# Patient Record
Sex: FEMALE | Race: AMERICAN INDIAN OR ALASKA NATIVE | URBAN - METROPOLITAN AREA
[De-identification: names, ages, dates, MRNs, and addresses within clinical notes are randomized per-mention and may not be internally consistent; named-entity substitution may affect disease eponyms.]

---

## 2020-08-24 ENCOUNTER — OUTPATIENT (OUTPATIENT)
Dept: OUTPATIENT SERVICES | Facility: HOSPITAL | Age: 30
LOS: 1 days | Discharge: HOME | End: 2020-08-24

## 2020-08-24 VITALS
HEART RATE: 68 BPM | WEIGHT: 158.07 LBS | RESPIRATION RATE: 17 BRPM | SYSTOLIC BLOOD PRESSURE: 116 MMHG | TEMPERATURE: 97 F | DIASTOLIC BLOOD PRESSURE: 71 MMHG | OXYGEN SATURATION: 100 % | HEIGHT: 62.6 IN

## 2020-08-24 DIAGNOSIS — Z01.818 ENCOUNTER FOR OTHER PREPROCEDURAL EXAMINATION: ICD-10-CM

## 2020-08-24 DIAGNOSIS — K80.20 CALCULUS OF GALLBLADDER WITHOUT CHOLECYSTITIS WITHOUT OBSTRUCTION: ICD-10-CM

## 2020-08-24 LAB
ALBUMIN SERPL ELPH-MCNC: 4.9 G/DL — SIGNIFICANT CHANGE UP (ref 3.5–5.2)
ALP SERPL-CCNC: 57 U/L — SIGNIFICANT CHANGE UP (ref 30–115)
ALT FLD-CCNC: 16 U/L — SIGNIFICANT CHANGE UP (ref 0–41)
ANION GAP SERPL CALC-SCNC: 10 MMOL/L — SIGNIFICANT CHANGE UP (ref 7–14)
AST SERPL-CCNC: 14 U/L — SIGNIFICANT CHANGE UP (ref 0–41)
BASOPHILS # BLD AUTO: 0.05 K/UL — SIGNIFICANT CHANGE UP (ref 0–0.2)
BASOPHILS NFR BLD AUTO: 0.7 % — SIGNIFICANT CHANGE UP (ref 0–1)
BILIRUB SERPL-MCNC: 0.6 MG/DL — SIGNIFICANT CHANGE UP (ref 0.2–1.2)
BUN SERPL-MCNC: 12 MG/DL — SIGNIFICANT CHANGE UP (ref 10–20)
CALCIUM SERPL-MCNC: 9.5 MG/DL — SIGNIFICANT CHANGE UP (ref 8.5–10.1)
CHLORIDE SERPL-SCNC: 103 MMOL/L — SIGNIFICANT CHANGE UP (ref 98–110)
CO2 SERPL-SCNC: 26 MMOL/L — SIGNIFICANT CHANGE UP (ref 17–32)
CREAT SERPL-MCNC: 0.8 MG/DL — SIGNIFICANT CHANGE UP (ref 0.7–1.5)
EOSINOPHIL # BLD AUTO: 0.15 K/UL — SIGNIFICANT CHANGE UP (ref 0–0.7)
EOSINOPHIL NFR BLD AUTO: 2.2 % — SIGNIFICANT CHANGE UP (ref 0–8)
GLUCOSE SERPL-MCNC: 69 MG/DL — LOW (ref 70–99)
HCT VFR BLD CALC: 40.8 % — SIGNIFICANT CHANGE UP (ref 37–47)
HGB BLD-MCNC: 13.2 G/DL — SIGNIFICANT CHANGE UP (ref 12–16)
IMM GRANULOCYTES NFR BLD AUTO: 0.1 % — SIGNIFICANT CHANGE UP (ref 0.1–0.3)
LYMPHOCYTES # BLD AUTO: 2.41 K/UL — SIGNIFICANT CHANGE UP (ref 1.2–3.4)
LYMPHOCYTES # BLD AUTO: 35.9 % — SIGNIFICANT CHANGE UP (ref 20.5–51.1)
MCHC RBC-ENTMCNC: 29.7 PG — SIGNIFICANT CHANGE UP (ref 27–31)
MCHC RBC-ENTMCNC: 32.4 G/DL — SIGNIFICANT CHANGE UP (ref 32–37)
MCV RBC AUTO: 91.9 FL — SIGNIFICANT CHANGE UP (ref 81–99)
MONOCYTES # BLD AUTO: 0.45 K/UL — SIGNIFICANT CHANGE UP (ref 0.1–0.6)
MONOCYTES NFR BLD AUTO: 6.7 % — SIGNIFICANT CHANGE UP (ref 1.7–9.3)
NEUTROPHILS # BLD AUTO: 3.64 K/UL — SIGNIFICANT CHANGE UP (ref 1.4–6.5)
NEUTROPHILS NFR BLD AUTO: 54.4 % — SIGNIFICANT CHANGE UP (ref 42.2–75.2)
NRBC # BLD: 0 /100 WBCS — SIGNIFICANT CHANGE UP (ref 0–0)
PLATELET # BLD AUTO: 332 K/UL — SIGNIFICANT CHANGE UP (ref 130–400)
POTASSIUM SERPL-MCNC: 4.3 MMOL/L — SIGNIFICANT CHANGE UP (ref 3.5–5)
POTASSIUM SERPL-SCNC: 4.3 MMOL/L — SIGNIFICANT CHANGE UP (ref 3.5–5)
PROT SERPL-MCNC: 7.5 G/DL — SIGNIFICANT CHANGE UP (ref 6–8)
RBC # BLD: 4.44 M/UL — SIGNIFICANT CHANGE UP (ref 4.2–5.4)
RBC # FLD: 12.4 % — SIGNIFICANT CHANGE UP (ref 11.5–14.5)
SODIUM SERPL-SCNC: 139 MMOL/L — SIGNIFICANT CHANGE UP (ref 135–146)
WBC # BLD: 6.71 K/UL — SIGNIFICANT CHANGE UP (ref 4.8–10.8)
WBC # FLD AUTO: 6.71 K/UL — SIGNIFICANT CHANGE UP (ref 4.8–10.8)

## 2020-08-24 NOTE — H&P PST ADULT - REASON FOR ADMISSION
28 Y/O FEMALE HERE FOR PRE-ADMISSION SURGICAL TESTING. PATIENT REPORTS SHE WENT TO GI MD WITH C/O BLOATING. W/U REVEALED +GALLBLADDER POLYP.   NOW FOR SCHEDULED PROCEDURE.

## 2020-08-24 NOTE — H&P PST ADULT - NSANTHOSAYNRD_GEN_A_CORE
No. ALEXANDR screening performed.  STOP BANG Legend: 0-2 = LOW Risk; 3-4 = INTERMEDIATE Risk; 5-8 = HIGH Risk

## 2020-08-24 NOTE — H&P PST ADULT - HISTORY OF PRESENT ILLNESS
PATIENT DENIES CHEST PAIN, SHORTNESS OF BREATH, PALPITATIONS, COUGHING, FEVER, DYSURIA.  CAN WALK UP 4-5  FLIGHTS OF STEPS WITHOUT SOB.

## 2020-08-28 ENCOUNTER — OUTPATIENT (OUTPATIENT)
Dept: OUTPATIENT SERVICES | Facility: HOSPITAL | Age: 30
LOS: 1 days | Discharge: HOME | End: 2020-08-28

## 2020-08-28 DIAGNOSIS — Z11.59 ENCOUNTER FOR SCREENING FOR OTHER VIRAL DISEASES: ICD-10-CM

## 2020-08-28 PROBLEM — Z78.9 OTHER SPECIFIED HEALTH STATUS: Chronic | Status: ACTIVE | Noted: 2020-08-24

## 2020-08-31 ENCOUNTER — RESULT REVIEW (OUTPATIENT)
Age: 30
End: 2020-08-31

## 2020-08-31 ENCOUNTER — OUTPATIENT (OUTPATIENT)
Dept: OUTPATIENT SERVICES | Facility: HOSPITAL | Age: 30
LOS: 1 days | Discharge: HOME | End: 2020-08-31
Payer: COMMERCIAL

## 2020-08-31 VITALS
HEIGHT: 63 IN | DIASTOLIC BLOOD PRESSURE: 73 MMHG | TEMPERATURE: 97 F | RESPIRATION RATE: 17 BRPM | OXYGEN SATURATION: 99 % | WEIGHT: 156.97 LBS | SYSTOLIC BLOOD PRESSURE: 106 MMHG | HEART RATE: 64 BPM

## 2020-08-31 VITALS — SYSTOLIC BLOOD PRESSURE: 102 MMHG | RESPIRATION RATE: 17 BRPM | HEART RATE: 61 BPM | DIASTOLIC BLOOD PRESSURE: 60 MMHG

## 2020-08-31 PROCEDURE — 88304 TISSUE EXAM BY PATHOLOGIST: CPT | Mod: 26

## 2020-08-31 RX ORDER — OXYCODONE AND ACETAMINOPHEN 5; 325 MG/1; MG/1
1 TABLET ORAL EVERY 4 HOURS
Refills: 0 | Status: DISCONTINUED | OUTPATIENT
Start: 2020-08-31 | End: 2020-08-31

## 2020-08-31 RX ORDER — ONDANSETRON 8 MG/1
4 TABLET, FILM COATED ORAL ONCE
Refills: 0 | Status: DISCONTINUED | OUTPATIENT
Start: 2020-08-31 | End: 2020-09-14

## 2020-08-31 RX ORDER — MORPHINE SULFATE 50 MG/1
2 CAPSULE, EXTENDED RELEASE ORAL
Refills: 0 | Status: DISCONTINUED | OUTPATIENT
Start: 2020-08-31 | End: 2020-08-31

## 2020-08-31 RX ORDER — SODIUM CHLORIDE 9 MG/ML
1000 INJECTION, SOLUTION INTRAVENOUS
Refills: 0 | Status: DISCONTINUED | OUTPATIENT
Start: 2020-08-31 | End: 2020-09-14

## 2020-08-31 RX ORDER — HYDROMORPHONE HYDROCHLORIDE 2 MG/ML
0.5 INJECTION INTRAMUSCULAR; INTRAVENOUS; SUBCUTANEOUS
Refills: 0 | Status: DISCONTINUED | OUTPATIENT
Start: 2020-08-31 | End: 2020-08-31

## 2020-08-31 RX ADMIN — HYDROMORPHONE HYDROCHLORIDE 0.5 MILLIGRAM(S): 2 INJECTION INTRAMUSCULAR; INTRAVENOUS; SUBCUTANEOUS at 10:17

## 2020-08-31 RX ADMIN — SODIUM CHLORIDE 100 MILLILITER(S): 9 INJECTION, SOLUTION INTRAVENOUS at 09:52

## 2020-08-31 RX ADMIN — HYDROMORPHONE HYDROCHLORIDE 0.5 MILLIGRAM(S): 2 INJECTION INTRAMUSCULAR; INTRAVENOUS; SUBCUTANEOUS at 10:30

## 2020-08-31 NOTE — ASU DISCHARGE PLAN (ADULT/PEDIATRIC) - ASU DC SPECIAL INSTRUCTIONSFT
SURGERY DISCHARGE INSTRUCTIONS    FOLLOW-UP - with Dr. Clancy in 1 week. Call the office to make an appointment or if you have any questions/concerns.    DIET - regular.    ACTIVITY- No heavy lifting for 4 wks over 10-20 lbs. Walking is encouraged. No running or swimming. No driving while taking pain medication.    WOUNDCARE - Some drainage from your incisions or drain sites is normal. Keep your dressings on. May shower 24 hours after surgery but no submerging wound under water for 2 weeks (tub bathing).  COLOSTOMY - If you have a colostomy follow your specific intstructions.    PAIN MEDS - Take prescription pain meds as instructed but only if needed as they can be addicting. Take over the counter extra strength tylenol 500mg and/or ibprofen 400mg with food every 6 hours for pain instead of prescription pain meds if you do not need stronger pain control. Do not take tylenol in addition to your prescription pain med if your prescription pain med already has tylenol in it. No more than 4g of tylenol in 24hrs or 1g in 4 hrs. No mixing alcohol with prescription pain meds.

## 2020-08-31 NOTE — ASU DISCHARGE PLAN (ADULT/PEDIATRIC) - CALL YOUR DOCTOR IF YOU HAVE ANY OF THE FOLLOWING:
Swelling that gets worse/Bleeding that does not stop/Fever greater than (need to indicate Fahrenheit or Celsius)/Wound/Surgical Site with redness, or foul smelling discharge or pus/Pain not relieved by Medications

## 2020-08-31 NOTE — CHART NOTE - NSCHARTNOTEFT_GEN_A_CORE
PACU ANESTHESIA ADMISSION NOTE      Procedure:   Post op diagnosis:      ____  Intubated  TV:______       Rate: ______      FiO2: ______    ___x_  Patent Airway    __x__  Full return of protective reflexes    __x__  Full recovery from anesthesia / back to baseline status    Vitals:  T(C): 36.2 (08-31-20 @ 07:39), Max: 36.2 (08-31-20 @ 07:03)  HR: 64 (08-31-20 @ 07:39) (64 - 64)  BP: 106/73 (08-31-20 @ 07:39) (106/73 - 106/73)  RR: 17 (08-31-20 @ 07:39) (17 - 17)  SpO2: 99% (08-31-20 @ 07:39) (99% - 99%)    Mental Status:  ___x_ Awake   __x___ Alert   _____ Drowsy   _____ Sedated    Nausea/Vomiting:  ____ NO  ____x__Yes,   See Post - Op Orders          Pain Scale (0-10):  _____    Treatment: ____ None    __x__ See Post - Op/PCA Orders    Post - Operative Fluids:   ____ Oral   ___x_ See Post - Op Orders    Plan: Discharge:   __x__Home       _____Floor     _____Critical Care    _____  Other:_________________    Comments: uneventful anesthesia course no complications. VItals stable. Pt transferred to PACU

## 2020-08-31 NOTE — ASU DISCHARGE PLAN (ADULT/PEDIATRIC) - CARE PROVIDER_API CALL
Blanca Clancy  SURGERY  [address] [place] [address]  Phone: [phone]  Fax: [phone]  Follow Up Time: 1 week

## 2020-09-08 DIAGNOSIS — K80.10 CALCULUS OF GALLBLADDER WITH CHRONIC CHOLECYSTITIS WITHOUT OBSTRUCTION: ICD-10-CM

## 2020-09-08 DIAGNOSIS — K82.8 OTHER SPECIFIED DISEASES OF GALLBLADDER: ICD-10-CM

## 2020-09-08 LAB — SURGICAL PATHOLOGY STUDY: SIGNIFICANT CHANGE UP

## 2020-10-16 PROBLEM — Z00.00 ENCOUNTER FOR PREVENTIVE HEALTH EXAMINATION: Status: ACTIVE | Noted: 2020-10-16

## 2020-10-22 ENCOUNTER — APPOINTMENT (OUTPATIENT)
Dept: BREAST CENTER | Facility: CLINIC | Age: 30
End: 2020-10-22
Payer: COMMERCIAL

## 2020-10-22 VITALS
HEIGHT: 63 IN | BODY MASS INDEX: 28.17 KG/M2 | WEIGHT: 159 LBS | TEMPERATURE: 97.6 F | SYSTOLIC BLOOD PRESSURE: 116 MMHG | DIASTOLIC BLOOD PRESSURE: 80 MMHG

## 2020-10-22 DIAGNOSIS — Z80.0 FAMILY HISTORY OF MALIGNANT NEOPLASM OF DIGESTIVE ORGANS: ICD-10-CM

## 2020-10-22 PROCEDURE — 99203 OFFICE O/P NEW LOW 30 MIN: CPT

## 2020-10-22 NOTE — DATA REVIEWED
[FreeTextEntry1] : Left Breast Sono - 08/21/2020:\par Ultrasound evaluation was performed including examination of all four quadrants of the breast(s) and the retroareolar region(s).\par In the left breast at 11:00 location 3 cm from the nipple likely corresponding to the finding seen on the CT there is a hypoechoic mass measuring 1.4 x 1.1 x 1.6 cm, may represent a complicated cyst. Ultrasound-guided aspiration/core biopsy is recommended for further evaluation.\par IMPRESSION:\par Indeterminant mass in the left breast 11:00 location 3 cm from the nipple likely corresponding to the findings seen on the CT for which ultrasound guided aspiration/core biopsy is recommended.\par Findings and recommendations were discussed with the patient.\par Biopsy of the left breast(s) is recommended. A letter will be sent to the patient to return for a biopsy.\par BI-RADS 4: SUSPICIOUS - CATEGORY 4A: LOW SUSPICION FOR MALIGNANCY\par \par US Guided Core Bx - 09/14/2020:\par Left, 11:00 N3, 1.6cm: (cork)\par Final Diagnosis\par Breast, left, 11 o'clock, 3 cm FN, ultrasound (US) guided biopsy:\par - Fibroepithelial lesion with increased stromal cellularity, see comment\par - Fascicular pseudoangiomatous stromal hyperplasia (PASH)\par - Background fibroadenomatoid and proliferative fibrocystic changes\par Comment: Differential diagnosis includes phyllodes tumor and cellular fibroadenoma.\par \par P63 (stromal cells-negative), CD34 (PASH stromal cells-positive), -QUD CK 34BE12(stromal cells -negative), calponin(myoepithelial cells-positive, PASH stromal cells-positive) immunostains performed on Block A1 support the diagnosis.\par Case was seen by another breast pathologist, who concurs.\par \par The pathology results are concordant with the imaging findings.

## 2020-10-22 NOTE — HISTORY OF PRESENT ILLNESS
[FreeTextEntry1] : PCP: Dr. Zoila Snell\par \par s/p US Guided Core Bx - 09/14/2020:\par Left, 11:00 N3, 1.6cm: (cork)\par Final Diagnosis\par Breast, left, 11 o'clock, 3 cm FN, ultrasound (US) guided biopsy:\par - Fibroepithelial lesion with increased stromal cellularity, see comment\par - Fascicular pseudoangiomatous stromal hyperplasia (PASH)\par - Background fibroadenomatoid and proliferative fibrocystic changes\par Comment: Differential diagnosis includes phyllodes tumor and cellular fibroadenoma.\par \par Pt denies any breast pain, palpable lumps, nipple discharge or inversion and / or skin changes.\par She had a CT scan for an abdominal issue which incidentally reported a left breast mass and she was sent for breast imaging.\par \par (-) family hx - breast / ovarian cancer.

## 2020-10-22 NOTE — PAST MEDICAL HISTORY
[Menstruating] : The patient is menstruating [Menarche Age ____] : age at menarche was [unfilled] [Total Preg ___] : G[unfilled] [Live Births ___] : P[unfilled]  [FreeTextEntry6] : No. [FreeTextEntry7] : No. [FreeTextEntry8] : N/A.

## 2020-10-22 NOTE — ASSESSMENT
[FreeTextEntry1] : SEBASTIAN is a adolph 29 year old patient who presented today in follow up for initial surgical consultation.\par She is status post ultrasound guided biopsy of the left breast on 09/14/2020.\par Pathology revealed a fibroepithelial lesion with increased stromal cellularity.\par \par On physical exam, there are no discrete masses in either breast or axilla.  There is no nipple discharge or inversion bilaterally.  There are no skin changes bilaterally.  \par \par In regards to the breast fibroepithelial lesion, the differential diagnosis for these lesions are fibroadenomas vs. phyllodes tumors.  We discussed fibroadenomas. These are benign lesions without any malignant potential. They are hormonally influenced and can increase or decrease in size and can also regress spontaneously. They are considered proliferative lesions without atypia. Patients with these lesions have been found to have a slightly increased relative risk of breast cancer compared to the reference population. Surgical excision is recommended when the diagnosis in unclear, the lesion is causing pain or breast deformity, or if it is rapidly enlarging. \par \par Because her diagnosis is not clear, I have recommended surgical excision of her right breast mass. The mass is not palpable, and will therefore need pre-operative wire localization.  She agrees to pre-operative wire localization and excision of the breast mass.  \par \par Informed consent was obtained today.  She is aware that she will meet with the pre-surgical department here at the hospital for pre-surgery testing.  She is also aware that she will likely need to meet with her primary care physician, and/or other medical specialists to obtain clearance for surgery, and to contact them appropriately.  \par \par I spent a total of 30 minutes of face to face time with this patient, greater than 50% of which was spent in counseling and/or coordination of care.\par All of her questions were appropriately answered.\par She knows to call with any concerns.\par

## 2020-10-30 ENCOUNTER — LABORATORY RESULT (OUTPATIENT)
Age: 30
End: 2020-10-30

## 2020-11-05 ENCOUNTER — OUTPATIENT (OUTPATIENT)
Dept: OUTPATIENT SERVICES | Facility: HOSPITAL | Age: 30
LOS: 1 days | Discharge: HOME | End: 2020-11-05

## 2020-11-05 DIAGNOSIS — Z02.9 ENCOUNTER FOR ADMINISTRATIVE EXAMINATIONS, UNSPECIFIED: ICD-10-CM

## 2020-11-06 ENCOUNTER — OUTPATIENT (OUTPATIENT)
Dept: OUTPATIENT SERVICES | Facility: HOSPITAL | Age: 30
LOS: 1 days | Discharge: HOME | End: 2020-11-06

## 2020-11-06 ENCOUNTER — NON-APPOINTMENT (OUTPATIENT)
Age: 30
End: 2020-11-06

## 2020-11-06 VITALS
HEART RATE: 72 BPM | DIASTOLIC BLOOD PRESSURE: 60 MMHG | RESPIRATION RATE: 16 BRPM | OXYGEN SATURATION: 99 % | TEMPERATURE: 98 F | HEIGHT: 62 IN | WEIGHT: 158.95 LBS | SYSTOLIC BLOOD PRESSURE: 104 MMHG

## 2020-11-06 DIAGNOSIS — Z90.49 ACQUIRED ABSENCE OF OTHER SPECIFIED PARTS OF DIGESTIVE TRACT: Chronic | ICD-10-CM

## 2020-11-06 RX ORDER — OMEGA-3 ACID ETHYL ESTERS 1 G
1 CAPSULE ORAL
Qty: 0 | Refills: 0 | DISCHARGE

## 2020-11-06 NOTE — H&P PST ADULT - HISTORY OF PRESENT ILLNESS
28 Y/O FEMALE PRESENTS TO PAST WITH C/O LUMP TO LEFT BREAST               PT NOW FOR SCHEDULED PROCEDURE. PT DENIES ANY CP SOB PALP COUGH DYSURIA FEVER URI. PT ABLE TO HANNAH 1-2 FOS W/O SOB  pt denies any covid s/s, or tested positive in the past  pt advised self quarantine till day of procedure  Anesthesia Alert  NO--Difficult Airway  NO--History of neck surgery or radiation  NO--Limited ROM of neck  NO--History of Malignant hyperthermia  NO--No personal or family history of Pseudocholinesterase deficiency.  NO--Prior Anesthesia Complication  NO--Latex Allergy  NO--Loose teeth  NO--History of Rheumatoid Arthritis  NO--ALEXANDR  NO--Other_____   30 Y/O FEMALE PRESENTS TO PAST WITH C/O LUMP TO LEFT BREAST . PT STATES WAS DISCOVERED ON CT ( S/P LAP AMY 9/2020)  PT NOW FOR SCHEDULED PROCEDURE. PT DENIES ANY CP SOB PALP COUGH DYSURIA FEVER URI. PT ABLE TO HANNAH 1-2 FOS W/O SOB  pt denies any covid s/s, or tested positive in the past  pt advised self quarantine till day of procedure  Anesthesia Alert  NO--Difficult Airway  NO--History of neck surgery or radiation  NO--Limited ROM of neck  NO--History of Malignant hyperthermia  NO--No personal or family history of Pseudocholinesterase deficiency.  NO--Prior Anesthesia Complication  NO--Latex Allergy  NO--Loose teeth  NO--History of Rheumatoid Arthritis  NO--ALEXANDR  NO--Other_____

## 2020-11-06 NOTE — H&P PST ADULT - NSICDXPASTSURGICALHX_GEN_ALL_CORE_FT
PAST SURGICAL HISTORY:  No significant past surgical history      PAST SURGICAL HISTORY:  History of cholecystectomy

## 2020-11-09 ENCOUNTER — NON-APPOINTMENT (OUTPATIENT)
Age: 30
End: 2020-11-09

## 2020-11-09 DIAGNOSIS — Z01.818 ENCOUNTER FOR OTHER PREPROCEDURAL EXAMINATION: ICD-10-CM

## 2020-11-09 DIAGNOSIS — N63.0 UNSPECIFIED LUMP IN UNSPECIFIED BREAST: ICD-10-CM

## 2020-11-13 ENCOUNTER — OUTPATIENT (OUTPATIENT)
Dept: OUTPATIENT SERVICES | Facility: HOSPITAL | Age: 30
LOS: 1 days | Discharge: HOME | End: 2020-11-13

## 2020-11-13 ENCOUNTER — LABORATORY RESULT (OUTPATIENT)
Age: 30
End: 2020-11-13

## 2020-11-13 DIAGNOSIS — Z11.59 ENCOUNTER FOR SCREENING FOR OTHER VIRAL DISEASES: ICD-10-CM

## 2020-11-13 DIAGNOSIS — Z90.49 ACQUIRED ABSENCE OF OTHER SPECIFIED PARTS OF DIGESTIVE TRACT: Chronic | ICD-10-CM

## 2020-11-16 ENCOUNTER — RESULT REVIEW (OUTPATIENT)
Age: 30
End: 2020-11-16

## 2020-11-16 ENCOUNTER — OUTPATIENT (OUTPATIENT)
Dept: OUTPATIENT SERVICES | Facility: HOSPITAL | Age: 30
LOS: 1 days | Discharge: HOME | End: 2020-11-16
Payer: COMMERCIAL

## 2020-11-16 ENCOUNTER — APPOINTMENT (OUTPATIENT)
Dept: BREAST CENTER | Facility: AMBULATORY SURGERY CENTER | Age: 30
End: 2020-11-16
Payer: COMMERCIAL

## 2020-11-16 VITALS
DIASTOLIC BLOOD PRESSURE: 56 MMHG | HEART RATE: 60 BPM | SYSTOLIC BLOOD PRESSURE: 92 MMHG | TEMPERATURE: 98 F | RESPIRATION RATE: 14 BRPM | OXYGEN SATURATION: 100 %

## 2020-11-16 VITALS
SYSTOLIC BLOOD PRESSURE: 97 MMHG | OXYGEN SATURATION: 100 % | HEIGHT: 62 IN | WEIGHT: 158.95 LBS | RESPIRATION RATE: 18 BRPM | DIASTOLIC BLOOD PRESSURE: 64 MMHG | HEART RATE: 62 BPM | TEMPERATURE: 98 F

## 2020-11-16 DIAGNOSIS — Z90.49 ACQUIRED ABSENCE OF OTHER SPECIFIED PARTS OF DIGESTIVE TRACT: Chronic | ICD-10-CM

## 2020-11-16 PROCEDURE — 19125 EXCISION BREAST LESION: CPT | Mod: LT

## 2020-11-16 PROCEDURE — 88305 TISSUE EXAM BY PATHOLOGIST: CPT | Mod: 26

## 2020-11-16 PROCEDURE — 19285 PERQ DEV BREAST 1ST US IMAG: CPT | Mod: LT

## 2020-11-16 RX ORDER — ONDANSETRON 8 MG/1
4 TABLET, FILM COATED ORAL ONCE
Refills: 0 | Status: DISCONTINUED | OUTPATIENT
Start: 2020-11-16 | End: 2020-11-30

## 2020-11-16 RX ORDER — SODIUM CHLORIDE 9 MG/ML
1000 INJECTION, SOLUTION INTRAVENOUS
Refills: 0 | Status: DISCONTINUED | OUTPATIENT
Start: 2020-11-16 | End: 2020-11-30

## 2020-11-16 RX ORDER — MORPHINE SULFATE 50 MG/1
2 CAPSULE, EXTENDED RELEASE ORAL
Refills: 0 | Status: DISCONTINUED | OUTPATIENT
Start: 2020-11-16 | End: 2020-11-16

## 2020-11-16 RX ORDER — OXYCODONE AND ACETAMINOPHEN 5; 325 MG/1; MG/1
1 TABLET ORAL EVERY 4 HOURS
Refills: 0 | Status: DISCONTINUED | OUTPATIENT
Start: 2020-11-16 | End: 2020-11-16

## 2020-11-16 RX ORDER — OXYCODONE AND ACETAMINOPHEN 5; 325 MG/1; MG/1
5-325 TABLET ORAL
Qty: 15 | Refills: 0 | Status: ACTIVE | COMMUNITY
Start: 2020-11-16 | End: 1900-01-01

## 2020-11-16 RX ADMIN — SODIUM CHLORIDE 100 MILLILITER(S): 9 INJECTION, SOLUTION INTRAVENOUS at 12:40

## 2020-11-16 NOTE — BRIEF OPERATIVE NOTE - NSICDXBRIEFPROCEDURE_GEN_ALL_CORE_FT
PROCEDURES:  Lumpectomy of left breast after needle localization 16-Nov-2020 12:24:47  Lebron Gomez

## 2020-11-16 NOTE — CHART NOTE - NSCHARTNOTEFT_GEN_A_CORE
PACU ANESTHESIA ADMISSION NOTE      Procedure:   Post op diagnosis:      ____  Intubated  TV:______       Rate: ______      FiO2: ______    _x___  Patent Airway    _x___  Full return of protective reflexes    _x___  Full recovery from anesthesia / back to baseline status    Vitals:  T(C): 36.8  HR: 62  BP: 97/64  RR: 18  SpO2: 100%    Mental Status:  _x___ Awake   _____ Alert   _____ Drowsy   _____ Sedated    Nausea/Vomiting:  _x___  NO       ______Yes,   See Post - Op Orders         Pain Scale (0-10):  __0___    Treatment: _x___ None    ____ See Post - Op/PCA Orders    Post - Operative Fluids:   __x__ Oral   ____ See Post - Op Orders    Plan: Discharge:   _x___Home       _____Floor     _____Critical Care    _____  Other:_________________    Comments:  No anesthesia issues or complications noted.  Discharge when criteria met.

## 2020-11-16 NOTE — ASU DISCHARGE PLAN (ADULT/PEDIATRIC) - CARE PROVIDER_API CALL
Lebron Gomez  SURGERY  [facility], [address]nd [place] [address]  Phone: [phone]  Fax: [phone]  Established Patient  Follow Up Time:

## 2020-11-16 NOTE — ASU DISCHARGE PLAN (ADULT/PEDIATRIC) - PATIENT EDUCATION MATERIALS PROVIED
Provider pre-printed instructions given/Pre-printed instructions given for other (specify)/pain management

## 2020-11-18 LAB — SURGICAL PATHOLOGY STUDY: SIGNIFICANT CHANGE UP

## 2020-11-19 DIAGNOSIS — N63.20 UNSPECIFIED LUMP IN THE LEFT BREAST, UNSPECIFIED QUADRANT: ICD-10-CM

## 2020-12-01 ENCOUNTER — APPOINTMENT (OUTPATIENT)
Dept: BREAST CENTER | Facility: CLINIC | Age: 30
End: 2020-12-01
Payer: COMMERCIAL

## 2020-12-01 VITALS
TEMPERATURE: 97.2 F | WEIGHT: 159 LBS | SYSTOLIC BLOOD PRESSURE: 112 MMHG | BODY MASS INDEX: 28.17 KG/M2 | HEIGHT: 63 IN | DIASTOLIC BLOOD PRESSURE: 74 MMHG

## 2020-12-01 PROCEDURE — 99024 POSTOP FOLLOW-UP VISIT: CPT

## 2021-01-28 NOTE — HISTORY OF PRESENT ILLNESS
[FreeTextEntry1] : PCP: Dr. Zoila Snell\par \par s/p US Guided Core Bx - 09/14/2020:\par Left, 11:00 N3, 1.6cm: (cork)\par Final Diagnosis\par Breast, left, 11 o'clock, 3 cm FN, ultrasound (US) guided biopsy:\par - Fibroepithelial lesion with increased stromal cellularity, see comment\par - Fascicular pseudoangiomatous stromal hyperplasia (PASH)\par - Background fibroadenomatoid and proliferative fibrocystic changes\par Comment: Differential diagnosis includes phyllodes tumor and cellular fibroadenoma.\par \par Pt denies any breast pain, palpable lumps, nipple discharge or inversion and / or skin changes.\par She had a CT scan for an abdominal issue which incidentally reported a left breast mass and she was sent for breast imaging.\par \par (-) family hx - breast / ovarian cancer.\par \par s/p Left breast Mass Excision - 11/16/2020:\par - Fibroepithelial lesion, favor partly cellular fibroadenoma,

## 2021-01-28 NOTE — REASON FOR VISIT
[Post Op: _________] : a [unfilled] post op visit [Spouse] : spouse [FreeTextEntry1] : s/p Left breast Mass Excision - 11/16/2020.

## 2021-01-28 NOTE — ASSESSMENT
[FreeTextEntry1] : SEBASTIAN is a adolph 30 year old patient who presented today to the office for her initial post-operative visit.\par She is status post Left breast Mass Excision on 11/16/2020.\par Pathology revealed fibroepithelial lesion, favor partly cellular fibroadenoma.\par She is feeling well.\par She denies any fever / chills or erythema and / or drainage related to the incision.\par Her pain is well controlled, only complains of mild soreness of the area.\par Her suture was removed and pathology was discussed.\par \par Plan:\par 1. Return for follow-up and clinical breast exam in 6 months.\par \par I spent a total of 15 minutes of face to face time with this patient, greater than 50% of which was spent in counseling and/or coordination of care.\par All of her questions were appropriately answered.\par She knows to call with any concerns.\par

## 2021-01-28 NOTE — REVIEW OF SYSTEMS
[Itching] : itching [Negative] : Constitutional [Fever] : no fever [Chills] : no chills [Breast Pain] : no breast pain [Breast Lump] : no breast lump [Nipple Discharge] : no nipple discharge [Nipple Inverted] : no inversion of the nipple

## 2021-01-28 NOTE — CONSULT LETTER
[Courtesy Letter:] : I had the pleasure of seeing your patient, [unfilled], in my office today. [( Thank you for referring [unfilled] for consultation for _____ )] : Thank you for referring [unfilled] for consultation for [unfilled] [Please see my note below.] : Please see my note below. [Sincerely,] : Sincerely, [FreeTextEntry2] : Zoila Perry MD\[facility]\[address]\[place] [address] [FreeTextEntry3] : Lebron Gomez M.D., F.A.C.S.\par

## 2021-01-28 NOTE — DATA REVIEWED
[FreeTextEntry1] : Surgical Final Report\par \par Final Diagnosis\par 1. Left breast mass needle localization:\par - Fibroepithelial lesion, favor partly cellular fibroadenoma,\par appears entirely excised (see also part 2).\par - Overt histopathologic features of phyllodes tumor are not seen\par in the examined sections.\par - No malignancy is seen.\par - Previous biopsy site is identified.\par \par 2. Left breast medial margin:\par - New tissue margin appears free of lesion.\par

## 2021-03-15 ENCOUNTER — NON-APPOINTMENT (OUTPATIENT)
Age: 31
End: 2021-03-15

## 2021-06-02 ENCOUNTER — APPOINTMENT (OUTPATIENT)
Dept: BREAST CENTER | Facility: CLINIC | Age: 31
End: 2021-06-02

## 2021-08-19 ENCOUNTER — APPOINTMENT (OUTPATIENT)
Dept: BREAST CENTER | Facility: CLINIC | Age: 31
End: 2021-08-19
Payer: COMMERCIAL

## 2021-10-14 ENCOUNTER — APPOINTMENT (OUTPATIENT)
Dept: BREAST CENTER | Facility: CLINIC | Age: 31
End: 2021-10-14
Payer: COMMERCIAL

## 2021-10-14 VITALS
BODY MASS INDEX: 29.06 KG/M2 | DIASTOLIC BLOOD PRESSURE: 80 MMHG | SYSTOLIC BLOOD PRESSURE: 118 MMHG | TEMPERATURE: 97.2 F | WEIGHT: 164 LBS | HEIGHT: 63 IN

## 2021-10-14 DIAGNOSIS — N64.4 MASTODYNIA: ICD-10-CM

## 2021-10-14 DIAGNOSIS — Q83.1 ACCESSORY BREAST: ICD-10-CM

## 2021-10-14 DIAGNOSIS — N63.20 UNSPECIFIED LUMP IN THE LEFT BREAST, UNSPECIFIED QUADRANT: ICD-10-CM

## 2021-10-14 PROCEDURE — 99213 OFFICE O/P EST LOW 20 MIN: CPT

## 2021-10-14 NOTE — HISTORY OF PRESENT ILLNESS
[FreeTextEntry1] : s/p US Guided Core Bx - 09/14/2020:\par Left, 11:00 N3, 1.6cm: (cork)\par Final Diagnosis\par Breast, left, 11 o'clock, 3 cm FN, ultrasound (US) guided biopsy:\par - Fibroepithelial lesion with increased stromal cellularity, see comment\par - Fascicular pseudoangiomatous stromal hyperplasia (PASH)\par - Background fibroadenomatoid and proliferative fibrocystic changes\par Comment: Differential diagnosis includes phyllodes tumor and cellular fibroadenoma.\par \par Pt denies any breast pain, palpable lumps, nipple discharge or inversion and / or skin changes.\par She had a CT scan for an abdominal issue which incidentally reported a left breast mass and she was sent for breast imaging.\par \par HISTORICAL RISK FACTORS:\par -(-) family hx - breast / ovarian cancer.\par PCP: Dr. Zoila Snell\par \par \par s/p Left breast Mass Excision - 11/16/2020:\par - Fibroepithelial lesion, favor partly cellular fibroadenoma,\par \par INTERVAL HISTORY: 10/14/21\par Katarina is 30 year old female here s/p Left breast mass excision on 11/16/20\par \par She is complaining of pain in her bilateral UOQ and in the accessory breast tissue present in her axilla.  She is also has intermittent breast pain in the LIQ of her left breast.  She has not palpated any abnormal masses and denies any other breast related complaints. \par

## 2021-10-14 NOTE — PHYSICAL EXAM
[Normocephalic] : normocephalic [Atraumatic] : atraumatic [EOMI] : extra ocular movement intact [No Supraclavicular Adenopathy] : no supraclavicular adenopathy [No Cervical Adenopathy] : no cervical adenopathy [No dominant masses] : no dominant masses in right breast  [No dominant masses] : no dominant masses left breast [No Nipple Retraction] : no left nipple retraction [No Nipple Discharge] : no left nipple discharge [No Axillary Lymphadenopathy] : no left axillary lymphadenopathy [Soft] : abdomen soft [Not Tender] : non-tender [No Edema] : no edema [No Rashes] : no rashes [No Ulceration] : no ulceration [de-identified] : accessory breast tissue in b/l axilla; no suspicious abnormalities palpated within either breast  [de-identified] : surgical incision is well healed

## 2021-10-14 NOTE — ASSESSMENT
[FreeTextEntry1] : SEBASTIAN is a adolph 30 year old patient who presented today to the office for her  \par She is status post Left breast Mass Excision on 11/16/2020.\par \par \par On exam, she has bilateral accessory breast tissue in the axillae but no suspicious abnormalities were palpated within either breast. \par \par In light of her new symptoms of breast pain, I have ordered her for a b/l targetted US.  If this is unrevealing, she can follow up in 1 year for a CBE. \par \par In regards to her breast pain, it may be related to fibrocystic changes within her breast that are hormonally influenced. We spoke about possible interventions including evening primrose oil, supportive bras, and decreasing caffeine intake.  Although none of these have been consistently proven to improve breast pain, they may be tried.  If the pain becomes very severe, there have been studies of tamoxifen being effective for the treatment of breast pain, although there are risks with tamoxifen.  At this time she will try supportive measures.\par \par In regards to accessory breast tissue in the axilla, I have offered to refer her to the plastic surgeon.  At this time, she is not interested in meeting the plastic surgeon. \par \par Otherwise she is at an average risk for breast cancer and should start with annual screening mammograms at the age of 42. \par \par All of her questions were answered.  She knows to call with any further questions or concerns. \par \par Plan:\par -b/l US now\par -if unrevealing, she can follow up in 1 year \par \par \par \par

## 2022-03-02 ENCOUNTER — APPOINTMENT (OUTPATIENT)
Dept: SURGERY | Facility: CLINIC | Age: 32
End: 2022-03-02
Payer: COMMERCIAL

## 2022-03-02 VITALS
BODY MASS INDEX: 28.88 KG/M2 | WEIGHT: 163 LBS | TEMPERATURE: 97.8 F | HEART RATE: 73 BPM | SYSTOLIC BLOOD PRESSURE: 98 MMHG | DIASTOLIC BLOOD PRESSURE: 60 MMHG | HEIGHT: 63 IN

## 2022-03-02 DIAGNOSIS — K60.2 ANAL FISSURE, UNSPECIFIED: ICD-10-CM

## 2022-03-02 PROCEDURE — 46600 DIAGNOSTIC ANOSCOPY SPX: CPT

## 2022-03-02 PROCEDURE — 99203 OFFICE O/P NEW LOW 30 MIN: CPT | Mod: 25

## 2022-03-04 RX ORDER — ELASTIC BANDAGE 2"X2.2YD
BANDAGE TOPICAL
Refills: 0 | Status: ACTIVE | COMMUNITY

## 2022-03-04 NOTE — HISTORY OF PRESENT ILLNESS
[FreeTextEntry1] : Patient is a 31F with no PMH who presents for evaluation of anal pain and bleeding.  Patient states she has pain and pressure with sitting.  She notices only mild irritation with BM.  She has 1-2 BM daily and they are irregular.  Patient denies fevers, chills, nausea, vomiting, abdominal pain, constipation, diarrhea, blood in the stool or unexpected weight loss.  Patient denies a family history of colon cancer rectal cancer or inflammatory bowel disease.  Patient is pending a colonoscopy later this week with Dr. Larkin

## 2022-03-04 NOTE — PHYSICAL EXAM
[No HSM] : no hepatosplenomegaly [Anterior] : anteriorly [Skin Tags] : residual hemorrhoidal skin tags were noted [Tight] : was tight [None] : there was no rectal mass  [Respiratory Effort] : normal respiratory effort [Normal Rate and Rhythm] : normal rate and rhythm [Abdomen Masses] : No abdominal masses [Abdomen Tenderness] : ~T No ~M abdominal tenderness [Excoriation] : no perianal excoriation [Fistula] : no fistulas [Wart] : no warts [Ulcer ___ cm] : no ulcers [Pilonidal Cyst] : no pilonidal cysts [Tender, Swollen] : nontender, non-swollen [Thrombosed] : that was not thrombosed [de-identified] : external examination shows an anterior skin tag and fissure.  Palpation produces mild pain [de-identified] : awake, alert and in no acute distress

## 2022-03-04 NOTE — ASSESSMENT
[FreeTextEntry1] : 31F with anal pain and pressure\par \par I spoke with the patient about her exam.  The only abnormality found was a fissure however it does not appear to be very painful at this time.  Given the tight sphincter tone, we will treat with nifedipine.  She will return in 2 months.

## 2022-03-04 NOTE — CONSULT LETTER
[Dear  ___] : Dear  [unfilled], [Consult Letter:] : I had the pleasure of evaluating your patient, [unfilled]. [Please see my note below.] : Please see my note below. [Consult Closing:] : Thank you very much for allowing me to participate in the care of this patient.  If you have any questions, please do not hesitate to contact me. [FreeTextEntry3] : Sincerely,\par \par Sebastian Weems MD, Colon and Rectal Surgery\par \par [employer] [facility]\par [address] [place] [address]\par [facility], 3rd Floor\par [place], [place] [address]\par Tel [phone] ext [phone]\par Fax [phone]\par

## 2022-05-04 ENCOUNTER — APPOINTMENT (OUTPATIENT)
Dept: SURGERY | Facility: CLINIC | Age: 32
End: 2022-05-04

## 2022-05-21 ENCOUNTER — NON-APPOINTMENT (OUTPATIENT)
Age: 32
End: 2022-05-21

## 2022-06-15 NOTE — ASU PATIENT PROFILE, ADULT - FALL HARM RISK CONCLUSION
Patient returned call.     She said she spoke with 8 different people at her insurance and the last person she spoke to told her that the Dr office needs to do a PA.     Patient would like Omeprazole sent to [facility].   Does not like Nexium as it is a powder.   [facility] - ok to send Rx for Omeprazole 40 mg - Take 1 QD #30? Any refills?    Informed pt writer needs to send message to [facility] to get the ok to send in the Rx and that [facility] will do this tomorrow   Informed patient once Rx is sent tomorrow, we will call her to let her know this and she can call her pharmacy to see if it needs a PA.     Clinical staff- It would probably be easier if we called the pharmacy after the Rx is sent to ask if it needs a PA since patient has had issues with [facility] taking Nexium out of the system as she noted before. Pt asked writer if that was legal and writer informed her writer could not answer that.   Universal Safety Interventions

## 2022-10-31 ENCOUNTER — NON-APPOINTMENT (OUTPATIENT)
Age: 32
End: 2022-10-31

## 2023-02-06 NOTE — ASU DISCHARGE PLAN (ADULT/PEDIATRIC) - DO NOT DRIVE IF TAKING PAIN MEDICATION
[FreeTextEntry1] : Location: back \par Severity: 4/10\par Duration: over 2 yr\par Aggravating Factors: walking\par Alleviating Factors: \par Associated Symptoms: denies weight loss, fever, chills, change in bowel/bladder habits, weakness, numbness/tingling, no more radiation down right leg\par Prior Studies: MRI \par 1/2023 right L5 and S1 NATALIE - over 50% relief, no more leg pain
NULL

## 2023-07-27 ENCOUNTER — OUTPATIENT (OUTPATIENT)
Dept: OUTPATIENT SERVICES | Facility: HOSPITAL | Age: 33
LOS: 1 days | End: 2023-07-27
Payer: COMMERCIAL

## 2023-07-27 DIAGNOSIS — N97.9 FEMALE INFERTILITY, UNSPECIFIED: ICD-10-CM

## 2023-07-27 DIAGNOSIS — Z90.49 ACQUIRED ABSENCE OF OTHER SPECIFIED PARTS OF DIGESTIVE TRACT: Chronic | ICD-10-CM

## 2023-07-27 DIAGNOSIS — Z00.8 ENCOUNTER FOR OTHER GENERAL EXAMINATION: ICD-10-CM

## 2023-07-27 PROCEDURE — 74740 X-RAY FEMALE GENITAL TRACT: CPT

## 2023-07-27 PROCEDURE — 58340 CATHETER FOR HYSTEROGRAPHY: CPT

## 2023-07-28 DIAGNOSIS — N97.9 FEMALE INFERTILITY, UNSPECIFIED: ICD-10-CM

## 2023-09-01 ENCOUNTER — NON-APPOINTMENT (OUTPATIENT)
Age: 33
End: 2023-09-01

## 2024-01-05 ENCOUNTER — NON-APPOINTMENT (OUTPATIENT)
Age: 34
End: 2024-01-05

## 2024-01-22 ENCOUNTER — NON-APPOINTMENT (OUTPATIENT)
Age: 34
End: 2024-01-22

## 2024-05-23 NOTE — ASU PATIENT PROFILE, ADULT - FALLEN IN THE PAST
Patient repeatedly complaining of pain in the perineal area, sporadic squeezing pain.  I have offered multiple times to speak to the provider regarding a medicinal aide for muscle/bladder spasms and he has refused multiple times.  I asked the patient if there was a reason he didn't want this kind of medication and he said no, he just didn't want it.  He stated he wanted something for pain.  I administered his ordered PRN medication and reiterated that a muscle or bladder relaxer can help the cause of the pain instead of just numbing it, and he still refused.  I also stated the muscle relaxer would not be instead of the pain control it would just be an additional tool and he still did not want it. Care ongoing.   no

## 2024-07-18 ENCOUNTER — APPOINTMENT (OUTPATIENT)
Dept: BREAST CENTER | Facility: CLINIC | Age: 34
End: 2024-07-18
Payer: COMMERCIAL

## 2024-07-18 DIAGNOSIS — Q83.1 ACCESSORY BREAST: ICD-10-CM

## 2024-07-18 DIAGNOSIS — Z00.00 ENCOUNTER FOR GENERAL ADULT MEDICAL EXAMINATION W/OUT ABNORMAL FINDINGS: ICD-10-CM

## 2024-07-18 PROCEDURE — 99212 OFFICE O/P EST SF 10 MIN: CPT

## 2024-07-29 ENCOUNTER — NON-APPOINTMENT (OUTPATIENT)
Age: 34
End: 2024-07-29

## 2024-07-31 ENCOUNTER — EMERGENCY (EMERGENCY)
Facility: HOSPITAL | Age: 34
LOS: 0 days | Discharge: HOME | End: 2024-07-31
Attending: STUDENT IN AN ORGANIZED HEALTH CARE EDUCATION/TRAINING PROGRAM
Payer: COMMERCIAL

## 2024-07-31 VITALS
OXYGEN SATURATION: 100 % | TEMPERATURE: 98 F | HEART RATE: 67 BPM | SYSTOLIC BLOOD PRESSURE: 107 MMHG | RESPIRATION RATE: 18 BRPM | DIASTOLIC BLOOD PRESSURE: 70 MMHG

## 2024-07-31 VITALS
HEIGHT: 63 IN | DIASTOLIC BLOOD PRESSURE: 88 MMHG | WEIGHT: 164.02 LBS | RESPIRATION RATE: 18 BRPM | SYSTOLIC BLOOD PRESSURE: 132 MMHG | OXYGEN SATURATION: 99 % | TEMPERATURE: 98 F | HEART RATE: 66 BPM

## 2024-07-31 DIAGNOSIS — R53.83 OTHER FATIGUE: ICD-10-CM

## 2024-07-31 DIAGNOSIS — R42 DIZZINESS AND GIDDINESS: ICD-10-CM

## 2024-07-31 DIAGNOSIS — R07.9 CHEST PAIN, UNSPECIFIED: ICD-10-CM

## 2024-07-31 DIAGNOSIS — Z90.49 ACQUIRED ABSENCE OF OTHER SPECIFIED PARTS OF DIGESTIVE TRACT: Chronic | ICD-10-CM

## 2024-07-31 DIAGNOSIS — R51.9 HEADACHE, UNSPECIFIED: ICD-10-CM

## 2024-07-31 LAB
ALBUMIN SERPL ELPH-MCNC: 4.7 G/DL — SIGNIFICANT CHANGE UP (ref 3.5–5.2)
ALP SERPL-CCNC: 69 U/L — SIGNIFICANT CHANGE UP (ref 30–115)
ALT FLD-CCNC: 19 U/L — SIGNIFICANT CHANGE UP (ref 0–41)
ANION GAP SERPL CALC-SCNC: 11 MMOL/L — SIGNIFICANT CHANGE UP (ref 7–14)
AST SERPL-CCNC: 15 U/L — SIGNIFICANT CHANGE UP (ref 0–41)
BASOPHILS # BLD AUTO: 0.05 K/UL — SIGNIFICANT CHANGE UP (ref 0–0.2)
BASOPHILS NFR BLD AUTO: 0.8 % — SIGNIFICANT CHANGE UP (ref 0–1)
BILIRUB SERPL-MCNC: 0.5 MG/DL — SIGNIFICANT CHANGE UP (ref 0.2–1.2)
BUN SERPL-MCNC: 10 MG/DL — SIGNIFICANT CHANGE UP (ref 10–20)
CALCIUM SERPL-MCNC: 9 MG/DL — SIGNIFICANT CHANGE UP (ref 8.4–10.5)
CHLORIDE SERPL-SCNC: 108 MMOL/L — SIGNIFICANT CHANGE UP (ref 98–110)
CO2 SERPL-SCNC: 21 MMOL/L — SIGNIFICANT CHANGE UP (ref 17–32)
CREAT SERPL-MCNC: 0.8 MG/DL — SIGNIFICANT CHANGE UP (ref 0.7–1.5)
EGFR: 101 ML/MIN/1.73M2 — SIGNIFICANT CHANGE UP
EOSINOPHIL # BLD AUTO: 0.07 K/UL — SIGNIFICANT CHANGE UP (ref 0–0.7)
EOSINOPHIL NFR BLD AUTO: 1.2 % — SIGNIFICANT CHANGE UP (ref 0–8)
GLUCOSE SERPL-MCNC: 70 MG/DL — SIGNIFICANT CHANGE UP (ref 70–99)
HCG SERPL QL: NEGATIVE — SIGNIFICANT CHANGE UP
HCT VFR BLD CALC: 40.1 % — SIGNIFICANT CHANGE UP (ref 37–47)
HGB BLD-MCNC: 13.3 G/DL — SIGNIFICANT CHANGE UP (ref 12–16)
IMM GRANULOCYTES NFR BLD AUTO: 0 % — LOW (ref 0.1–0.3)
LYMPHOCYTES # BLD AUTO: 2.28 K/UL — SIGNIFICANT CHANGE UP (ref 1.2–3.4)
LYMPHOCYTES # BLD AUTO: 37.7 % — SIGNIFICANT CHANGE UP (ref 20.5–51.1)
MAGNESIUM SERPL-MCNC: 2.2 MG/DL — SIGNIFICANT CHANGE UP (ref 1.8–2.4)
MCHC RBC-ENTMCNC: 30.5 PG — SIGNIFICANT CHANGE UP (ref 27–31)
MCHC RBC-ENTMCNC: 33.2 G/DL — SIGNIFICANT CHANGE UP (ref 32–37)
MCV RBC AUTO: 92 FL — SIGNIFICANT CHANGE UP (ref 81–99)
MONOCYTES # BLD AUTO: 0.43 K/UL — SIGNIFICANT CHANGE UP (ref 0.1–0.6)
MONOCYTES NFR BLD AUTO: 7.1 % — SIGNIFICANT CHANGE UP (ref 1.7–9.3)
NEUTROPHILS # BLD AUTO: 3.21 K/UL — SIGNIFICANT CHANGE UP (ref 1.4–6.5)
NEUTROPHILS NFR BLD AUTO: 53.2 % — SIGNIFICANT CHANGE UP (ref 42.2–75.2)
NRBC # BLD: 0 /100 WBCS — SIGNIFICANT CHANGE UP (ref 0–0)
NT-PROBNP SERPL-SCNC: <36 PG/ML — SIGNIFICANT CHANGE UP (ref 0–300)
PLATELET # BLD AUTO: 324 K/UL — SIGNIFICANT CHANGE UP (ref 130–400)
PMV BLD: 10 FL — SIGNIFICANT CHANGE UP (ref 7.4–10.4)
POTASSIUM SERPL-MCNC: 4.6 MMOL/L — SIGNIFICANT CHANGE UP (ref 3.5–5)
POTASSIUM SERPL-SCNC: 4.6 MMOL/L — SIGNIFICANT CHANGE UP (ref 3.5–5)
PROT SERPL-MCNC: 6.8 G/DL — SIGNIFICANT CHANGE UP (ref 6–8)
RBC # BLD: 4.36 M/UL — SIGNIFICANT CHANGE UP (ref 4.2–5.4)
RBC # FLD: 12.2 % — SIGNIFICANT CHANGE UP (ref 11.5–14.5)
SODIUM SERPL-SCNC: 140 MMOL/L — SIGNIFICANT CHANGE UP (ref 135–146)
TROPONIN T, HIGH SENSITIVITY RESULT: <6 NG/L — SIGNIFICANT CHANGE UP (ref 6–13)
WBC # BLD: 6.04 K/UL — SIGNIFICANT CHANGE UP (ref 4.8–10.8)
WBC # FLD AUTO: 6.04 K/UL — SIGNIFICANT CHANGE UP (ref 4.8–10.8)

## 2024-07-31 PROCEDURE — 84484 ASSAY OF TROPONIN QUANT: CPT

## 2024-07-31 PROCEDURE — 70450 CT HEAD/BRAIN W/O DYE: CPT | Mod: MC

## 2024-07-31 PROCEDURE — 99285 EMERGENCY DEPT VISIT HI MDM: CPT

## 2024-07-31 PROCEDURE — 71045 X-RAY EXAM CHEST 1 VIEW: CPT

## 2024-07-31 PROCEDURE — 84703 CHORIONIC GONADOTROPIN ASSAY: CPT

## 2024-07-31 PROCEDURE — 36000 PLACE NEEDLE IN VEIN: CPT

## 2024-07-31 PROCEDURE — 99285 EMERGENCY DEPT VISIT HI MDM: CPT | Mod: 25

## 2024-07-31 PROCEDURE — 93005 ELECTROCARDIOGRAM TRACING: CPT

## 2024-07-31 PROCEDURE — 80053 COMPREHEN METABOLIC PANEL: CPT

## 2024-07-31 PROCEDURE — 83880 ASSAY OF NATRIURETIC PEPTIDE: CPT

## 2024-07-31 PROCEDURE — 83735 ASSAY OF MAGNESIUM: CPT

## 2024-07-31 PROCEDURE — 36415 COLL VENOUS BLD VENIPUNCTURE: CPT

## 2024-07-31 PROCEDURE — 93010 ELECTROCARDIOGRAM REPORT: CPT

## 2024-07-31 PROCEDURE — 85025 COMPLETE CBC W/AUTO DIFF WBC: CPT

## 2024-07-31 PROCEDURE — 71045 X-RAY EXAM CHEST 1 VIEW: CPT | Mod: 26

## 2024-07-31 PROCEDURE — 70450 CT HEAD/BRAIN W/O DYE: CPT | Mod: 26,MC

## 2024-07-31 NOTE — ED PROVIDER NOTE - NSFOLLOWUPCLINICS_GEN_ALL_ED_FT
[facility]  Medicine  [address], [place]   Phone: [phone]  Fax:

## 2024-07-31 NOTE — ED ADULT NURSE NOTE - OBJECTIVE STATEMENT
pt presents to ED with c/o weakness. pt since friday shes been feeling very tired and weak with no improvement. pt c/o nausea, vomiting. pt denies fever. pt went to urgent care and had EKG done and was sent to ED.

## 2024-07-31 NOTE — ED PROVIDER NOTE - PHYSICAL EXAMINATION
HPI:   Patient has history right breast cancer  She has history of colon cancer  She has history of DVT leg  She has iron deficiency anemia  In 2012 she was diagnosed to have stage IIIB hormone receptor positive and HER-2 negative right breast cancer  She had right mastectomy, and lymph node dissection   There were 9 positive lymph nodes  She had Adriamycin + Cytoxan chemotherapy followed by Taxotere and after that radiation therapy  Since November/December 2012 she has been on Femara and she will take Femara until December 2022  Patient has osteoporosis and she has been on vitamin-D  and Prolia   Has problem swallowing calcium tablet  No problem with her teeth for Prolia  In 2016 she was found to have benign clustered calcifications in left breast and had a biopsy and that was benign  Dr Dave Estrada takes care of her mammography       In December 2012 patient developed DVT right leg and she was started on Xarelto  She had GI bleeding in 2015  Xarelto was stopped  On colonoscopy she was found to have stage I right colon cancer  On 7/22/15 she underwent right hemicolectomy  Pathological diagnosis right colon cancer, stage I, T2 N0 MX, grade 2, No lymphovascular invasion and no perineural invasion  She did not require adjuvant therapy for colon cancer  CEA remains high and is being monitored  This time CEA is 4 8  She has developed iron deficiency anemia and she has been taking oral iron and stool color is dark for that reason or from bleeding  She will have GI evaluation and intravenous Venofer  Arrangements are being made   Blake Beard has been on baby aspirin  She is not on anticoagulation anymore  Follow-up Venous Doppler study was negative for DVT   She runs high D-dimer  History of thyroid nodule  She had biopsy of thyroid nodule in June 2014 and she states that was negative for cancer  She gets thyroid ultrasound yearly for surveillance  Has some tiredness, low back discomfort and alopecia    She has kyphosis                           Current Outpatient Medications:   •  amLODIPine (NORVASC) 2 5 mg tablet, Take 1 tablet (2 5 mg total) by mouth daily, Disp: 30 tablet, Rfl: 5  •  ascorbic acid (VITAMIN C) 500 MG tablet, Take 1,000 mg by mouth daily , Disp: , Rfl:   •  aspirin 81 MG tablet, Take 81 mg by mouth daily  , Disp: , Rfl:   •  benazepril-hydrochlorthiazide (LOTENSIN HCT) 20-12 5 MG per tablet, take 1 tablet by mouth once daily, Disp: 90 tablet, Rfl: 5  •  bimatoprost (LUMIGAN) 0 01 % ophthalmic drops, Administer 1 drop to both eyes daily at bedtime , Disp: , Rfl:   •  calcium citrate (CALCITRATE) 950 MG tablet, Take 1 tablet by mouth in the morning , Disp: , Rfl:   •  Cholecalciferol (VITAMIN D-3 PO), Take 1 capsule by mouth 3 (three) times a day , Disp: , Rfl:   •  Cyanocobalamin (VITAMIN B 12 PO), Take 1 tablet by mouth daily  , Disp: , Rfl:   •  Diclofenac Sodium (VOLTAREN) 1 %, Apply 2 g topically 4 (four) times a day, Disp: , Rfl:   •  ferrous sulfate 325 (65 Fe) mg tablet, Take 325 mg by mouth daily with breakfast , Disp: , Rfl:   •  letrozole (FEMARA) 2 5 mg tablet, take 1 tablet by mouth once daily, Disp: 30 tablet, Rfl: 5  •  Magnesium 250 MG TABS, Take 1 tablet by mouth daily  , Disp: , Rfl:   •  metFORMIN (GLUCOPHAGE) 500 mg tablet, Take 1 tablet (500 mg total) by mouth 2 (two) times a day with meals, Disp: 60 tablet, Rfl: 5  •  sitaGLIPtin (JANUVIA) 100 mg tablet, Take 1 tablet (100 mg total) by mouth daily, Disp: 30 tablet, Rfl: 5  •  vitamin A 10,000 units capsule, Take 10,000 Units by mouth daily  , Disp: , Rfl:   •  vitamin E 100 UNIT capsule, Take 100 Units by mouth daily , Disp: , Rfl:     No Known Allergies    Oncology History Overview Note    In 2012 patient was diagnosed to have Hormone receptor positive, HER-2 negative stage IIIB cancer in right breast  She had right mastectomy and lymph node dissection   9 lymph nodes showed metastatic disease   Patient was given Adriamycin + Cytoxan chemotherapy followed by Taxotere and after that radiation therapy  Since November/December 2012 she has been on Femara     She will be on Femara for a total of 10 years  On 7/22/15 she underwent right hemicolectomy  Pathological diagnosis right colon cancer, stage I, T2 N0 MX, grade 2,no lymphovascular invasion and no perineural invasion  She did not require adjuvant therapy for colon cancer  She has been running slightly high CEA and that is being monitored  Malignant neoplasm of right breast in female, estrogen receptor positive ([facility] [address] )   6/21/2018 Initial Diagnosis    Malignant neoplasm of right breast in female, estrogen receptor positive [facility])      Surgery     2012- right mastectomy   2015 - right hemicolectomy      Radiation     [id number]: Radiation to right chest wall and lymph nodes      Chemotherapy     2012 -Adriamycin plus Cytoxan followed by Taxotere Followed by Femara  She will have Femara for a total of 10 years  ROS:  12/23/22 Reviewed 12 systems: See symptoms in HPI:    Presently no other neurological , cardiac, pulmonary, GI and  symptoms other than listed in HPI     Other symptoms are in HPI  No other symptoms like fevers, chills,  bone pains, skin rash, weight loss, night sweats, numbness, claudication and gait problem  Has some anxiety  No swelling of the ankles and no swollen glands  Not unusually sensitive to heat or cold  /82 (BP Location: Left arm, Patient Position: Sitting, Cuff Size: Standard)   Pulse 97   Temp 98 6 °F (37 °C) (Temporal)   Ht 5' 1" (1 549 m)   Wt 61 2 kg (135 lb)   LMP  (LMP Unknown)   SpO2 96%   BMI 25 51 kg/m²     Physical Exam:  Alert and oriented and not in distress  Stable vitals    There is no icterus , no oral thrush, no palpable neck mass,  lung fields clear to percussion and auscultation, regular heart rate, abdomen soft and non tender , no palpable abdominal mass, no ascites, no edema of ankles, no calf tenderness, no focal neurological deficit, no skin rash, no palpable lymphadenopathy in the neck and axillary areas,  no clubbing  Patient is anxious  Performance status 1  Right mastectomy scar  No lymphedema     Has kyphosis    IMAGING:  IMPRESSION:     No nodule meets current ACR criteria for requiring biopsy but followup ultrasound is recommended in 1 year             Reference: ACR Thyroid Imaging, Reporting and Data System (TI-RADS): White Paper of the [employer]  J AM Carlo Radiol 1313;79:730-352  (additional recommendations based on American Thyroid Association 2015 guidelines )        Workstation performed: [id number]      Imaging    US thyroid (Order: [id number]) - 6/17/2020  ASSESSMENT/BI-RADS CATEGORY:  Left: 2 - Benign  Overall: 2 - Benign     RECOMMENDATION:       - Routine screening mammogram in 1 year for the left breast      Workstation ID: [id number]          Imaging    Mammo screening left w 3d & cad (Order: [id number]) - 10/19/2021    RESULTS:      LUMBAR SPINE L2-L4 (L1 vertebra excluded from analysis due to local structural abnormalities or artifact): BMD  0 774  gm/cm2   T-score -2 8    These values are artifactually elevated due to the presence of scoliosis with spondylosis      LEFT  TOTAL HIP:   BMD:  0 688  gm/cm2    T-score:  -2 1     LEFT  FEMORAL NECK:   BMD:  0 628  gm/cm2   T score: -2 0      RIGHT  FOREARM:    33% RADIUS BMD:  0 490  gm/cm2  T-score:  -3 3         IMPRESSION:     1  Osteoporosis  [Based on the lumbar spine and right radius]       IMPRESSION:  Enlarged heterogeneous left thyroid lobe and thyroid isthmus  Grossly stable if not smaller peripherally densely calcified left thyroid isthmus nodule, this has been stable for more than 5 years  Asymmetrically bulky left thyroid lobe with prominent interpolar region which I do not believe is a true nodule  Followup ultrasound is recommended in 1 year     Smaller nodules which do not meet current ACR criteria for requiring biopsy      Reference: ACR Thyroid Imaging, Reporting and Data System (TI-RADS): White Paper of the [employer]  J AM Carlo Radiol 6417;87:699-151  (additional recommendations based on American Thyroid Association 2015 guidelines )        Workstation performed: [id number]          Imaging    US thyroid (Order: [id number]) - 7/14/  IMPRESSION:     1  No findings for hypermetabolic malignancy  2   Diffuse thyroid gland activity suggests thyroiditis      Workstation performed: [id number]          Imaging    NM PET CT skull base to mid thigh (Order: [id number]) - 7/14/2021    IMPRESSION:     Diffusely heterogeneous thyroid gland, and left isthmus nodule, without significant change  There are no findings requiring fine-needle aspiration           Reference: ACR Thyroid Imaging, Reporting and Data System (TI-RADS): White Paper of the [employer]  J AM Carlo Radiol 8297;51:536-013  (additional recommendations based on American Thyroid Association 2015 guidelines )        Workstation performed: [id number]        Imaging    US thyroid (Order: [id number]) - 7/26/2022  ASSESSMENT/BI-RADS CATEGORY:  Left: 0 - Incomplete: Needs Additional Imaging Evaluation  Overall: 0 - Incomplete: Needs Additional Imaging Evaluation     RECOMMENDATION:       - Diagnostic mammogram at the current time for the left breast        - Ultrasound at the current time for the left breast      Workstation ID: [id number]        Imaging    Mammo screening left w 3d & cad (Order: [id number]) - 10/21/2022    LABS:    Results for orders placed or performed in visit on 12/20/22   POCT hemoglobin A1c   Result Value Ref Range    Hemoglobin A1C 6 3 6 5     Hemoglobin 10 1 1  WBC 6550 and platelets 202,276  Neutrophile 4900  Iron saturation 12%  Ferritin 27  Normal B12 and folate levels  Reticulocyte 2 4% and sedimentation rate 42  Normal LDH  Total protein 8 5 with albumin 3 7 normal liver enzymes, bilirubin, creatinine and calcium    CEA 4 8     Labs, Imaging, & Other studies:   All pertinent labs and imaging studies were personally reviewed        Reviewed test results and discussed with patient  Assessment and plan:      Patient has history right breast cancer  She has history of colon cancer  She has history of DVT leg  She has iron deficiency anemia  In 2012 she was diagnosed to have stage IIIB hormone receptor positive and HER-2 negative right breast cancer  She had right mastectomy, and lymph node dissection   There were 9 positive lymph nodes  She had Adriamycin + Cytoxan chemotherapy followed by Taxotere and after that radiation therapy  Since November/December 2012 she has been on Femara and she will take Femara until December 2022  Patient has osteoporosis and she has been on vitamin-D  and Prolia   Has problem swallowing calcium tablet  No problem with her teeth for Prolia  In 2016 she was found to have benign clustered calcifications in left breast and had a biopsy and that was benign  Dr Jeanine Murcia takes care of her mammography       In December 2012 patient developed DVT right leg and she was started on Xarelto  She had GI bleeding in 2015  Xarelto was stopped  On colonoscopy she was found to have stage I right colon cancer  On 7/22/15 she underwent right hemicolectomy  Pathological diagnosis right colon cancer, stage I, T2 N0 MX, grade 2, No lymphovascular invasion and no perineural invasion  She did not require adjuvant therapy for colon cancer  CEA remains high and is being monitored  This time CEA is 4 8  She has developed iron deficiency anemia and she has been taking oral iron and stool color is dark for that reason or from bleeding  She will have GI evaluation and intravenous Venofer  Arrangements are being made   [place] [employer] has been on baby aspirin  She is not on anticoagulation anymore  Follow-up Venous Doppler study was negative for DVT   She runs high D-dimer  History of thyroid nodule   She had biopsy of thyroid nodule in June 2014 and she states that was negative for cancer  She gets thyroid ultrasound yearly for surveillance  Has some tiredness, low back discomfort and alopecia  She has kyphosis         Physical examination and test results are as recorded and discussed   Breast cancer and colon cancers are in remission   Goal is cure from both the cancers   No active DVT at present   D-dimer test is being monitored and remains high   Thyroid nodules are being monitored     CEA is high 4 8 and highest reading was 5 6  and is being monitored   Ordered anemia workup and that showed iron deficiency  Stool test for blood is pending  Arranging GI evaluation and intravenous Venofer    All discussed in detail   Questions answered  Discussed the importance of self-breast examination, eating healthy foods, staying active as tolerated and health screening tests   Patient is capable of self-care     Discussed precautions against coronavirus and flu and other infections  She will continue to follow with her primary physician and other consultants  See diagnoses, orders and instructions below  1  Malignant neoplasm of ascending colon (HCC)    - CEA; Future    2  Malignant neoplasm of right breast in female, estrogen receptor positive, unspecified site of breast ([facility] [address] )      3  Abnormal tumor markers      4  History of DVT (deep vein thrombosis)      5  Iron deficiency anemia, unspecified iron deficiency anemia type    - CBC and differential; Future  - Iron Panel (Includes Ferritin, Iron Sat%, Iron, and TIBC); Future  - Ambulatory referral to Gastroenterology; Future    6  Thyroid nodule      7  History of diabetes mellitus      8  D-dimer, elevated          Blood work prior to next visit in 2 months  Referral to GI  Starting intravenous Venofer 200 mg once a week x5                                    I used dictation device to dictate this note and there could be mistakes in my note                     Patient voiced understanding and agrees      Counseling / Coordination of Care      Provided counseling and support CONSTITUTIONAL: Well-appearing; well-nourished; in no apparent distress.   NECK: Supple; non-tender; no cervical lymphadenopathy.   CARDIOVASCULAR: Normal S1, S2; no murmurs, rubs, or gallops.   RESPIRATORY: Normal chest excursion with respiration; breath sounds clear and equal bilaterally; no wheezes, rhonchi, or rales.  GI/: Non-distended; non-tender; no palpable organomegaly.   MS: No evidence of trauma or deformity. No CVA tenderness. Normal ROM in all four extremities; non-tender to palpation; distal pulses are normal.   SKIN: Normal for age and race; warm; dry; good turgor; no apparent lesions or exudate.   NEURO/PSYCH: A & O x 4; grossly unremarkable. mood and manner are appropriate.

## 2024-07-31 NOTE — ED ADULT NURSE NOTE - CHIEF COMPLAINT QUOTE
Pt reports tiredness for a few days with headache and dizziness with vomiting and headache and chest heaviness. Pt was seen at [facility] for [place] and was told to come to ER or see a primary for EKG results

## 2024-07-31 NOTE — ED PROVIDER NOTE - CLINICAL SUMMARY MEDICAL DECISION MAKING FREE TEXT BOX
33-year-old female with no significant past medical history who presented to ED due to chest pain and dizziness since yesterday.  Patient states that they went to the urgent care.  EKG was told that his abnormal comments to come to ED for evaluation.  EKG reviewed shows possible T wave flattening on V2.Patient also states today has been tired. Exam within normal limits troponin negative symptom over 6 hours heart score is 2 patient discharged with primary care follow-up.

## 2024-07-31 NOTE — ED PROVIDER NOTE - NSFOLLOWUPINSTRUCTIONS_ED_ALL_ED_FT
Fatigue    WHAT YOU NEED TO KNOW:    Fatigue is mental and physical exhaustion that does not get better with rest. Fatigue may make daily activities difficult or cause extreme sleepiness. It is normal to feel tired sometimes, but long-term fatigue may be a sign of serious illness.    DISCHARGE INSTRUCTIONS:    Return to the emergency department if:     You have chest pain.       You have difficulty breathing.     Contact your healthcare provider if:     You have a cough that gets worse, or does not go away.       You see blood in your urine or bowel movement.       You have numbness or tingling around your mouth or in an arm or leg.       You faint, feel dizzy, or have vision changes.       You have swelling in your lymph nodes.       You are a woman and have vaginal bleeding that is not normal for you, or is not expected.       You lose weight without trying, or you have trouble eating.       You feel weak or have muscle pain.       You have pain or swelling in your joints.      You have questions or concerns about your condition or care.     Follow up with your healthcare provider as directed: You may need more tests. Your healthcare provider may also refer you to a specialist. Write down your questions so you remember to ask them during your visits.    Manage fatigue:     Keep a fatigue diary. Include anything that makes you feel more tired or less tired. Bring the diary with you to follow-up visits with your provider.      Exercise as directed. Exercise can help you feel more alert. Exercise can also help you manage stress or relieve depression. Try to get at least 30 minutes of exercise most days of the week.      Keep a regular sleep schedule. Go to bed and wake up at the same times every day. Limit naps to 1 hour each day. A nap can improve fatigue, but a long nap may make it harder to go to sleep at night.      Plan and limit your activities. Limit the number of activities such as shopping and cleaning you do each day. If possible, try to spread out your trips throughout the week. Plan ahead so you are not rushing to get something done. Only do activities that you have the energy to complete. Take breaks between activities. Ask for help if you need it. Another person may be able to drive you or help with daily activities.      Eat a variety of healthy foods. Healthy foods include fruits, vegetables, whole-grain breads, low-fat dairy products, beans, lean meats, and fish. Good nutrition can help manage fatigue.      Limit caffeine and alcohol. These can make it difficult to fall or stay asleep. Women should limit alcohol to 1 drink a day. Men should limit alcohol to 2 drinks a day. A drink of alcohol is 12 ounces of beer, 5 ounces of wine, or 1½ ounces of liquor. Ask our healthcare provider how much caffeine is safe for you.      Do not smoke. Nicotine and other chemicals in cigarettes and cigars can cause lung damage and increase fatigue. Ask your healthcare provider for information if you currently smoke and need help to quit. E-cigarettes or smokeless tobacco still contain nicotine. Talk to your healthcare provider before you use these products.          © Copyright [employer] 2019 All illustrations and images included in CareNotes are the copyrighted property of [employer]M., Inc. or [employer].

## 2024-07-31 NOTE — ED PROVIDER NOTE - PATIENT PORTAL LINK FT
You can access the FollowMyHealth Patient Portal offered by [facility] by registering at the following website: [email or web address]. By joining [employer]’s FollowMyHealth portal, you will also be able to view your health information using other applications (apps) compatible with our system.

## 2024-07-31 NOTE — ED PROVIDER NOTE - OBJECTIVE STATEMENT
pt presents to ED c/o feeling fatigued for the last several days. at onset, she also had HA dizziness and nbnb emesis. went to UC last night and was referred to ED for ?abnl EKG but declined. she came to ED today because she still feels tired. does not have a PMD. Denies fever/chill/chest pain/palpitation/sob/abd pain/d/ black stool/bloody stool/urinary sxs

## 2024-07-31 NOTE — ED ADULT TRIAGE NOTE - CHIEF COMPLAINT QUOTE
Pt reports tiredness for a few days with headache and dizziness with vomiting and headache and chest heaviness. Pt was [place] [facility] [place] and was told to come to ER or see a primary for EKG results

## 2024-07-31 NOTE — ED ADULT NURSE NOTE - NSFALLUNIVINTERV_ED_ALL_ED
Bed/Stretcher in lowest position, wheels locked, appropriate side rails in place/Call bell, personal items and telephone in reach/Instruct patient to call for assistance before getting out of bed/chair/stretcher/Non-slip footwear applied when patient is off stretcher/[place] to call system/Physically safe environment - no spills, clutter or unnecessary equipment/Purposeful proactive rounding/Room/bathroom lighting operational, light cord in reach

## 2024-09-02 ENCOUNTER — NON-APPOINTMENT (OUTPATIENT)
Age: 34
End: 2024-09-02

## 2024-11-13 ENCOUNTER — OUTPATIENT (OUTPATIENT)
Dept: OUTPATIENT SERVICES | Facility: HOSPITAL | Age: 34
LOS: 1 days | End: 2024-11-13
Payer: COMMERCIAL

## 2024-11-13 ENCOUNTER — APPOINTMENT (OUTPATIENT)
Dept: OBGYN | Facility: CLINIC | Age: 34
End: 2024-11-13
Payer: COMMERCIAL

## 2024-11-13 VITALS
SYSTOLIC BLOOD PRESSURE: 124 MMHG | WEIGHT: 170 LBS | HEIGHT: 63 IN | BODY MASS INDEX: 30.12 KG/M2 | DIASTOLIC BLOOD PRESSURE: 75 MMHG

## 2024-11-13 DIAGNOSIS — Z34.90 ENCOUNTER FOR SUPERVISION OF NORMAL PREGNANCY, UNSPECIFIED, UNSPECIFIED TRIMESTER: ICD-10-CM

## 2024-11-13 LAB
BILIRUB UR QL STRIP: NORMAL
CLARITY UR: CLEAR
COLLECTION METHOD: NORMAL
GLUCOSE UR-MCNC: NORMAL
HCG UR QL: 0.2 EU/DL
HGB UR QL STRIP.AUTO: NORMAL
KETONES UR-MCNC: NORMAL
LEUKOCYTE ESTERASE UR QL STRIP: NORMAL
NITRITE UR QL STRIP: NORMAL
PH UR STRIP: 6.5
PROT UR STRIP-MCNC: NORMAL
SP GR UR STRIP: 1.01

## 2024-11-13 PROCEDURE — 81002 URINALYSIS NONAUTO W/O SCOPE: CPT

## 2024-11-13 PROCEDURE — 87624 HPV HI-RISK TYP POOLED RSLT: CPT

## 2024-11-13 PROCEDURE — 88142 CYTOPATH C/V THIN LAYER: CPT

## 2024-11-13 PROCEDURE — 99204 OFFICE O/P NEW MOD 45 MIN: CPT | Mod: 25

## 2024-11-13 PROCEDURE — 87591 N.GONORRHOEAE DNA AMP PROB: CPT

## 2024-11-13 PROCEDURE — 87481 CANDIDA DNA AMP PROBE: CPT

## 2024-11-13 PROCEDURE — 81513 NFCT DS BV RNA VAG FLU ALG: CPT

## 2024-11-13 PROCEDURE — 76817 TRANSVAGINAL US OBSTETRIC: CPT | Mod: 26

## 2024-11-13 PROCEDURE — 76815 OB US LIMITED FETUS(S): CPT | Mod: 26

## 2024-11-13 PROCEDURE — 87661 TRICHOMONAS VAGINALIS AMPLIF: CPT

## 2024-11-13 PROCEDURE — 87491 CHLMYD TRACH DNA AMP PROBE: CPT

## 2024-11-13 RX ORDER — ASPIRIN 81 MG/1
81 TABLET, CHEWABLE ORAL DAILY
Qty: 30 | Refills: 10 | Status: ACTIVE | COMMUNITY
Start: 2024-11-13 | End: 1900-01-01

## 2024-11-14 ENCOUNTER — OUTPATIENT (OUTPATIENT)
Dept: OUTPATIENT SERVICES | Facility: HOSPITAL | Age: 34
LOS: 1 days | End: 2024-11-14

## 2024-11-14 DIAGNOSIS — Z90.49 ACQUIRED ABSENCE OF OTHER SPECIFIED PARTS OF DIGESTIVE TRACT: Chronic | ICD-10-CM

## 2024-11-14 DIAGNOSIS — Z34.90 ENCOUNTER FOR SUPERVISION OF NORMAL PREGNANCY, UNSPECIFIED, UNSPECIFIED TRIMESTER: ICD-10-CM

## 2024-11-14 LAB
BV BACTERIA RRNA VAG QL NAA+PROBE: NOT DETECTED
C GLABRATA RNA VAG QL NAA+PROBE: NOT DETECTED
C TRACH RRNA SPEC QL NAA+PROBE: NOT DETECTED
CANDIDA RRNA VAG QL PROBE: NOT DETECTED
N GONORRHOEA RRNA SPEC QL NAA+PROBE: NOT DETECTED
T VAGINALIS RRNA SPEC QL NAA+PROBE: NOT DETECTED

## 2024-11-15 ENCOUNTER — NON-APPOINTMENT (OUTPATIENT)
Age: 34
End: 2024-11-15

## 2024-11-15 ENCOUNTER — APPOINTMENT (OUTPATIENT)
Dept: OBGYN | Facility: CLINIC | Age: 34
End: 2024-11-15

## 2024-11-15 DIAGNOSIS — Z34.90 ENCOUNTER FOR SUPERVISION OF NORMAL PREGNANCY, UNSPECIFIED, UNSPECIFIED TRIMESTER: ICD-10-CM

## 2024-11-21 LAB — HPV HIGH+LOW RISK DNA PNL CVX: NOT DETECTED

## 2024-11-22 ENCOUNTER — NON-APPOINTMENT (OUTPATIENT)
Age: 34
End: 2024-11-22

## 2024-11-23 ENCOUNTER — OUTPATIENT (OUTPATIENT)
Dept: OUTPATIENT SERVICES | Facility: HOSPITAL | Age: 34
LOS: 1 days | End: 2024-11-23
Payer: COMMERCIAL

## 2024-11-23 DIAGNOSIS — Z34.90 ENCOUNTER FOR SUPERVISION OF NORMAL PREGNANCY, UNSPECIFIED, UNSPECIFIED TRIMESTER: ICD-10-CM

## 2024-11-23 DIAGNOSIS — Z90.49 ACQUIRED ABSENCE OF OTHER SPECIFIED PARTS OF DIGESTIVE TRACT: Chronic | ICD-10-CM

## 2024-11-23 PROCEDURE — 86787 VARICELLA-ZOSTER ANTIBODY: CPT

## 2024-11-23 PROCEDURE — 81420 FETAL CHRMOML ANEUPLOIDY: CPT

## 2024-11-23 PROCEDURE — 81329 SMN1 GENE DOS/DELETION ALYS: CPT

## 2024-11-23 PROCEDURE — 86765 RUBEOLA ANTIBODY: CPT

## 2024-11-23 PROCEDURE — 86803 HEPATITIS C AB TEST: CPT

## 2024-11-23 PROCEDURE — 81220 CFTR GENE COM VARIANTS: CPT

## 2024-11-23 PROCEDURE — 86850 RBC ANTIBODY SCREEN: CPT

## 2024-11-23 PROCEDURE — 83655 ASSAY OF LEAD: CPT

## 2024-11-23 PROCEDURE — 36415 COLL VENOUS BLD VENIPUNCTURE: CPT

## 2024-11-23 PROCEDURE — 81222 CFTR GENE DUP/DELET VARIANTS: CPT

## 2024-11-23 PROCEDURE — 87340 HEPATITIS B SURFACE AG IA: CPT

## 2024-11-23 PROCEDURE — 81243 FMR1 GEN ALY DETC ABNL ALLEL: CPT

## 2024-11-23 PROCEDURE — 81257 HBA1/HBA2 GENE: CPT

## 2024-11-23 PROCEDURE — 86762 RUBELLA ANTIBODY: CPT

## 2024-11-23 PROCEDURE — 83036 HEMOGLOBIN GLYCOSYLATED A1C: CPT

## 2024-11-23 PROCEDURE — 86780 TREPONEMA PALLIDUM: CPT

## 2024-11-23 PROCEDURE — 83020 HEMOGLOBIN ELECTROPHORESIS: CPT

## 2024-11-23 PROCEDURE — 81443 GENETIC TSTG SEVERE INH COND: CPT

## 2024-11-23 PROCEDURE — 81422 FETAL CHRMOML MICRODELTJ: CPT

## 2024-11-23 PROCEDURE — G0452: CPT | Mod: 26

## 2024-11-23 PROCEDURE — 85027 COMPLETE CBC AUTOMATED: CPT

## 2024-11-23 PROCEDURE — 87389 HIV-1 AG W/HIV-1&-2 AB AG IA: CPT

## 2024-11-23 PROCEDURE — 83020 HEMOGLOBIN ELECTROPHORESIS: CPT | Mod: 26

## 2024-11-23 PROCEDURE — 86900 BLOOD TYPING SEROLOGIC ABO: CPT

## 2024-11-24 DIAGNOSIS — Z34.90 ENCOUNTER FOR SUPERVISION OF NORMAL PREGNANCY, UNSPECIFIED, UNSPECIFIED TRIMESTER: ICD-10-CM

## 2024-11-24 LAB
ABO + RH PNL BLD: NORMAL
APPEARANCE: CLEAR
BILIRUBIN URINE: NEGATIVE
BLD GP AB SCN SERPL QL: NORMAL
BLOOD URINE: NEGATIVE
COLOR: YELLOW
ESTIMATED AVERAGE GLUCOSE: 105 MG/DL
GLUCOSE QUALITATIVE U: NEGATIVE MG/DL
HBA1C MFR BLD HPLC: 5.3 %
HBV SURFACE AG SER QL: NONREACTIVE
HCT VFR BLD CALC: 37.3 %
HCV AB SER QL: NONREACTIVE
HCV S/CO RATIO: 0.05 COI
HGB BLD-MCNC: 12.5 G/DL
HIV1+2 AB SPEC QL IA.RAPID: NONREACTIVE
KETONES URINE: NEGATIVE MG/DL
LEUKOCYTE ESTERASE URINE: NEGATIVE
MCHC RBC-ENTMCNC: 31 PG
MCHC RBC-ENTMCNC: 33.5 G/DL
MCV RBC AUTO: 92.6 FL
MEV IGG FLD QL IA: 199 AU/ML
MEV IGG+IGM SER-IMP: POSITIVE
NITRITE URINE: NEGATIVE
PH URINE: 7
PLATELET # BLD AUTO: 345 K/UL
PMV BLD AUTO: 0 /100 WBCS
PMV BLD: 9.9 FL
PROTEIN URINE: NEGATIVE MG/DL
RBC # BLD: 4.03 M/UL
RBC # FLD: 12.8 %
RUBV IGG FLD-ACNC: 2.31 INDEX
RUBV IGG SER-IMP: POSITIVE
SPECIFIC GRAVITY URINE: 1.01
T PALLIDUM AB SER QL IA: NEGATIVE
UROBILINOGEN URINE: 0.2 MG/DL
VZV AB TITR SER: POSITIVE
VZV IGG SER IF-ACNC: 9.46 S/CO
WBC # FLD AUTO: 9.67 K/UL

## 2024-12-04 ENCOUNTER — OUTPATIENT (OUTPATIENT)
Dept: OUTPATIENT SERVICES | Facility: HOSPITAL | Age: 34
LOS: 1 days | End: 2024-12-04
Payer: COMMERCIAL

## 2024-12-04 ENCOUNTER — ASOB RESULT (OUTPATIENT)
Age: 34
End: 2024-12-04

## 2024-12-04 ENCOUNTER — APPOINTMENT (OUTPATIENT)
Dept: ANTEPARTUM | Facility: CLINIC | Age: 34
End: 2024-12-04
Payer: COMMERCIAL

## 2024-12-04 DIAGNOSIS — Z90.49 ACQUIRED ABSENCE OF OTHER SPECIFIED PARTS OF DIGESTIVE TRACT: Chronic | ICD-10-CM

## 2024-12-04 DIAGNOSIS — Z34.90 ENCOUNTER FOR SUPERVISION OF NORMAL PREGNANCY, UNSPECIFIED, UNSPECIFIED TRIMESTER: ICD-10-CM

## 2024-12-04 PROCEDURE — 76856 US EXAM PELVIC COMPLETE: CPT

## 2024-12-04 PROCEDURE — 76801 OB US < 14 WKS SINGLE FETUS: CPT | Mod: 26

## 2024-12-04 PROCEDURE — 76813 OB US NUCHAL MEAS 1 GEST: CPT | Mod: 26

## 2024-12-04 PROCEDURE — 76830 TRANSVAGINAL US NON-OB: CPT

## 2024-12-06 DIAGNOSIS — N95.0 POSTMENOPAUSAL BLEEDING: ICD-10-CM

## 2024-12-06 DIAGNOSIS — E66.9 OBESITY, UNSPECIFIED: ICD-10-CM

## 2024-12-06 DIAGNOSIS — N80.00 ENDOMETRIOSIS OF THE UTERUS, UNSPECIFIED: ICD-10-CM

## 2024-12-11 ENCOUNTER — NON-APPOINTMENT (OUTPATIENT)
Age: 34
End: 2024-12-11

## 2024-12-11 ENCOUNTER — APPOINTMENT (OUTPATIENT)
Dept: OBGYN | Facility: CLINIC | Age: 34
End: 2024-12-11

## 2024-12-18 ENCOUNTER — NON-APPOINTMENT (OUTPATIENT)
Age: 34
End: 2024-12-18

## 2024-12-18 ENCOUNTER — APPOINTMENT (OUTPATIENT)
Dept: OBGYN | Facility: CLINIC | Age: 34
End: 2024-12-18

## 2024-12-18 VITALS
TEMPERATURE: 98.6 F | WEIGHT: 175 LBS | SYSTOLIC BLOOD PRESSURE: 122 MMHG | HEIGHT: 63 IN | HEART RATE: 85 BPM | BODY MASS INDEX: 31.01 KG/M2 | DIASTOLIC BLOOD PRESSURE: 84 MMHG

## 2024-12-18 DIAGNOSIS — Z33.1 PREGNANT STATE, INCIDENTAL: ICD-10-CM

## 2024-12-18 DIAGNOSIS — Z78.9 OTHER SPECIFIED HEALTH STATUS: ICD-10-CM

## 2024-12-18 LAB
BILIRUB UR QL STRIP: NORMAL
CLARITY UR: CLEAR
COLLECTION METHOD: NORMAL
GLUCOSE UR-MCNC: ABNORMAL
HCG UR QL: 0.2 EU/DL
HCG UR QL: POSITIVE
HGB UR QL STRIP.AUTO: ABNORMAL
KETONES UR-MCNC: ABNORMAL
LEUKOCYTE ESTERASE UR QL STRIP: NORMAL
NITRITE UR QL STRIP: NORMAL
PH UR STRIP: 6.5
PROT UR STRIP-MCNC: NORMAL
QUALITY CONTROL: YES
SP GR UR STRIP: 1.02

## 2024-12-18 PROCEDURE — XXXXX: CPT

## 2024-12-18 RX ORDER — PRENATAL VIT 49/IRON FUM/FOLIC 6.75-0.2MG
TABLET ORAL
Refills: 0 | Status: ACTIVE | COMMUNITY

## 2024-12-20 ENCOUNTER — NON-APPOINTMENT (OUTPATIENT)
Age: 34
End: 2024-12-20

## 2024-12-20 LAB
25(OH)D3 SERPL-MCNC: 32 NG/ML
ANION GAP SERPL CALC-SCNC: 11 MMOL/L
B19V IGG SER QL IA: 6.51 INDEX
B19V IGG+IGM SER-IMP: NORMAL
B19V IGG+IGM SER-IMP: POSITIVE
B19V IGM FLD-ACNC: 0.27 INDEX
B19V IGM SER-ACNC: NEGATIVE
BUN SERPL-MCNC: 6 MG/DL
CALCIUM SERPL-MCNC: 9.4 MG/DL
CHLORIDE SERPL-SCNC: 104 MMOL/L
CMV IGG SERPL QL: 5.2 U/ML
CMV IGG SERPL-IMP: POSITIVE
CMV IGM SERPL QL: <8 AU/ML
CMV IGM SERPL QL: NEGATIVE
CO2 SERPL-SCNC: 20 MMOL/L
CREAT SERPL-MCNC: 0.5 MG/DL
EGFR: 128 ML/MIN/1.73M2
GLUCOSE SERPL-MCNC: 80 MG/DL
HSV 1+2 IGG SER IA-IMP: NEGATIVE
HSV 1+2 IGG SER IA-IMP: NEGATIVE
HSV1 IGG SER QL: 0.36 INDEX
HSV2 IGG SER QL: 0.07 INDEX
LEAD BLD-MCNC: 1.3 UG/DL
MUV AB SER-ACNC: POSITIVE
MUV IGG SER QL IA: 105 AU/ML
POTASSIUM SERPL-SCNC: 4.3 MMOL/L
SODIUM SERPL-SCNC: 135 MMOL/L
T GONDII AB SER-IMP: NEGATIVE
T GONDII AB SER-IMP: NEGATIVE
T GONDII IGG SER QL: <3 IU/ML
T GONDII IGM SER QL: <3 AU/ML
TSH SERPL-ACNC: 1.99 UIU/ML

## 2024-12-23 LAB — BACTERIA UR CULT: NORMAL

## 2024-12-24 ENCOUNTER — EMERGENCY (EMERGENCY)
Facility: HOSPITAL | Age: 34
LOS: 0 days | Discharge: HOME | End: 2024-12-24
Attending: EMERGENCY MEDICINE
Payer: COMMERCIAL

## 2024-12-24 ENCOUNTER — NON-APPOINTMENT (OUTPATIENT)
Age: 34
End: 2024-12-24

## 2024-12-24 VITALS
DIASTOLIC BLOOD PRESSURE: 76 MMHG | RESPIRATION RATE: 18 BRPM | OXYGEN SATURATION: 100 % | SYSTOLIC BLOOD PRESSURE: 118 MMHG | HEART RATE: 80 BPM

## 2024-12-24 VITALS
WEIGHT: 175.05 LBS | RESPIRATION RATE: 20 BRPM | TEMPERATURE: 98 F | HEART RATE: 75 BPM | DIASTOLIC BLOOD PRESSURE: 73 MMHG | SYSTOLIC BLOOD PRESSURE: 104 MMHG | OXYGEN SATURATION: 99 %

## 2024-12-24 DIAGNOSIS — Z3A.15 15 WEEKS GESTATION OF PREGNANCY: ICD-10-CM

## 2024-12-24 DIAGNOSIS — Z90.49 ACQUIRED ABSENCE OF OTHER SPECIFIED PARTS OF DIGESTIVE TRACT: Chronic | ICD-10-CM

## 2024-12-24 DIAGNOSIS — O99.891 OTHER SPECIFIED DISEASES AND CONDITIONS COMPLICATING PREGNANCY: ICD-10-CM

## 2024-12-24 DIAGNOSIS — R11.0 NAUSEA: ICD-10-CM

## 2024-12-24 LAB
ALBUMIN SERPL ELPH-MCNC: 3.9 G/DL — SIGNIFICANT CHANGE UP (ref 3.5–5.2)
ALP SERPL-CCNC: 49 U/L — SIGNIFICANT CHANGE UP (ref 30–115)
ALT FLD-CCNC: 69 U/L — HIGH (ref 0–41)
ANION GAP SERPL CALC-SCNC: 13 MMOL/L — SIGNIFICANT CHANGE UP (ref 7–14)
APPEARANCE UR: CLEAR — SIGNIFICANT CHANGE UP
AST SERPL-CCNC: 36 U/L — SIGNIFICANT CHANGE UP (ref 0–41)
BASOPHILS # BLD AUTO: 0.03 K/UL — SIGNIFICANT CHANGE UP (ref 0–0.2)
BASOPHILS NFR BLD AUTO: 0.3 % — SIGNIFICANT CHANGE UP (ref 0–1)
BILIRUB SERPL-MCNC: 0.4 MG/DL — SIGNIFICANT CHANGE UP (ref 0.2–1.2)
BILIRUB UR-MCNC: NEGATIVE — SIGNIFICANT CHANGE UP
BUN SERPL-MCNC: 6 MG/DL — LOW (ref 10–20)
CALCIUM SERPL-MCNC: 9.3 MG/DL — SIGNIFICANT CHANGE UP (ref 8.4–10.5)
CHLORIDE SERPL-SCNC: 103 MMOL/L — SIGNIFICANT CHANGE UP (ref 98–110)
CO2 SERPL-SCNC: 19 MMOL/L — SIGNIFICANT CHANGE UP (ref 17–32)
COLOR SPEC: YELLOW — SIGNIFICANT CHANGE UP
CREAT SERPL-MCNC: 0.6 MG/DL — LOW (ref 0.7–1.5)
DIFF PNL FLD: NEGATIVE — SIGNIFICANT CHANGE UP
EGFR: 122 ML/MIN/1.73M2 — SIGNIFICANT CHANGE UP
EOSINOPHIL # BLD AUTO: 0.18 K/UL — SIGNIFICANT CHANGE UP (ref 0–0.7)
EOSINOPHIL NFR BLD AUTO: 1.8 % — SIGNIFICANT CHANGE UP (ref 0–8)
GLUCOSE SERPL-MCNC: 87 MG/DL — SIGNIFICANT CHANGE UP (ref 70–99)
GLUCOSE UR QL: NEGATIVE MG/DL — SIGNIFICANT CHANGE UP
HCT VFR BLD CALC: 36.1 % — LOW (ref 37–47)
HGB BLD-MCNC: 12.3 G/DL — SIGNIFICANT CHANGE UP (ref 12–16)
IMM GRANULOCYTES NFR BLD AUTO: 0.3 % — SIGNIFICANT CHANGE UP (ref 0.1–0.3)
KETONES UR-MCNC: NEGATIVE MG/DL — SIGNIFICANT CHANGE UP
LEUKOCYTE ESTERASE UR-ACNC: NEGATIVE — SIGNIFICANT CHANGE UP
LYMPHOCYTES # BLD AUTO: 2.3 K/UL — SIGNIFICANT CHANGE UP (ref 1.2–3.4)
LYMPHOCYTES # BLD AUTO: 23.3 % — SIGNIFICANT CHANGE UP (ref 20.5–51.1)
MCHC RBC-ENTMCNC: 31.2 PG — HIGH (ref 27–31)
MCHC RBC-ENTMCNC: 34.1 G/DL — SIGNIFICANT CHANGE UP (ref 32–37)
MCV RBC AUTO: 91.6 FL — SIGNIFICANT CHANGE UP (ref 81–99)
MONOCYTES # BLD AUTO: 0.58 K/UL — SIGNIFICANT CHANGE UP (ref 0.1–0.6)
MONOCYTES NFR BLD AUTO: 5.9 % — SIGNIFICANT CHANGE UP (ref 1.7–9.3)
NEUTROPHILS # BLD AUTO: 6.76 K/UL — HIGH (ref 1.4–6.5)
NEUTROPHILS NFR BLD AUTO: 68.4 % — SIGNIFICANT CHANGE UP (ref 42.2–75.2)
NITRITE UR-MCNC: NEGATIVE — SIGNIFICANT CHANGE UP
NRBC # BLD: 0 /100 WBCS — SIGNIFICANT CHANGE UP (ref 0–0)
PH UR: 7.5 — SIGNIFICANT CHANGE UP (ref 5–8)
PLATELET # BLD AUTO: 326 K/UL — SIGNIFICANT CHANGE UP (ref 130–400)
PMV BLD: 9.7 FL — SIGNIFICANT CHANGE UP (ref 7.4–10.4)
POTASSIUM SERPL-MCNC: 4.1 MMOL/L — SIGNIFICANT CHANGE UP (ref 3.5–5)
POTASSIUM SERPL-SCNC: 4.1 MMOL/L — SIGNIFICANT CHANGE UP (ref 3.5–5)
PROT SERPL-MCNC: 6.4 G/DL — SIGNIFICANT CHANGE UP (ref 6–8)
PROT UR-MCNC: NEGATIVE MG/DL — SIGNIFICANT CHANGE UP
RBC # BLD: 3.94 M/UL — LOW (ref 4.2–5.4)
RBC # FLD: 12.6 % — SIGNIFICANT CHANGE UP (ref 11.5–14.5)
SODIUM SERPL-SCNC: 135 MMOL/L — SIGNIFICANT CHANGE UP (ref 135–146)
SP GR SPEC: 1.01 — SIGNIFICANT CHANGE UP (ref 1–1.03)
UROBILINOGEN FLD QL: 0.2 MG/DL — SIGNIFICANT CHANGE UP (ref 0.2–1)
WBC # BLD: 9.88 K/UL — SIGNIFICANT CHANGE UP (ref 4.8–10.8)
WBC # FLD AUTO: 9.88 K/UL — SIGNIFICANT CHANGE UP (ref 4.8–10.8)

## 2024-12-24 PROCEDURE — 87491 CHLMYD TRACH DNA AMP PROBE: CPT

## 2024-12-24 PROCEDURE — 76770 US EXAM ABDO BACK WALL COMP: CPT

## 2024-12-24 PROCEDURE — 85025 COMPLETE CBC W/AUTO DIFF WBC: CPT

## 2024-12-24 PROCEDURE — 36000 PLACE NEEDLE IN VEIN: CPT

## 2024-12-24 PROCEDURE — 76857 US EXAM PELVIC LIMITED: CPT | Mod: 26

## 2024-12-24 PROCEDURE — 81003 URINALYSIS AUTO W/O SCOPE: CPT

## 2024-12-24 PROCEDURE — 99285 EMERGENCY DEPT VISIT HI MDM: CPT

## 2024-12-24 PROCEDURE — 76770 US EXAM ABDO BACK WALL COMP: CPT | Mod: 26

## 2024-12-24 PROCEDURE — 99284 EMERGENCY DEPT VISIT MOD MDM: CPT | Mod: 25

## 2024-12-24 PROCEDURE — 87661 TRICHOMONAS VAGINALIS AMPLIF: CPT

## 2024-12-24 PROCEDURE — 87798 DETECT AGENT NOS DNA AMP: CPT

## 2024-12-24 PROCEDURE — 80053 COMPREHEN METABOLIC PANEL: CPT

## 2024-12-24 PROCEDURE — 76857 US EXAM PELVIC LIMITED: CPT

## 2024-12-24 PROCEDURE — 87801 DETECT AGNT MULT DNA AMPLI: CPT

## 2024-12-24 PROCEDURE — 87591 N.GONORRHOEAE DNA AMP PROB: CPT

## 2024-12-24 PROCEDURE — 36415 COLL VENOUS BLD VENIPUNCTURE: CPT

## 2024-12-24 RX ORDER — SODIUM CHLORIDE 9 MG/ML
1000 INJECTION, SOLUTION INTRAMUSCULAR; INTRAVENOUS; SUBCUTANEOUS ONCE
Refills: 0 | Status: COMPLETED | OUTPATIENT
Start: 2024-12-24 | End: 2024-12-24

## 2024-12-24 RX ORDER — ACETAMINOPHEN 500MG 500 MG/1
650 TABLET, COATED ORAL ONCE
Refills: 0 | Status: COMPLETED | OUTPATIENT
Start: 2024-12-24 | End: 2024-12-24

## 2024-12-24 RX ADMIN — ACETAMINOPHEN 500MG 650 MILLIGRAM(S): 500 TABLET, COATED ORAL at 10:54

## 2024-12-24 RX ADMIN — SODIUM CHLORIDE 1000 MILLILITER(S): 9 INJECTION, SOLUTION INTRAMUSCULAR; INTRAVENOUS; SUBCUTANEOUS at 10:54

## 2024-12-24 NOTE — CONSULT NOTE ADULT - ATTENDING COMMENTS
I saw and examined patient at bedside with resident.  The CL and SVE was c/l/p and no evidence of obstetric cause of pain.  On exam she has intermittent colicky pain in right mid/lower back and sates she feels it " deep inside", has some nausea, no vomiting.  She denies VB, LOF, dysuria. There is some mild CVA tenderness on right on exam.  She is afebrile, WBC 9.  Patient counseled on recommendation to get IV hydration, tylenol and official renal sonogram to r/o renal stone. Patient given strict pain and bleeding precautions  and will follow up after imaging.  She is amenable and agrees with mgmt plan. - AF

## 2024-12-24 NOTE — ED ADULT NURSE NOTE - NSFALLUNIVINTERV_ED_ALL_ED
Bed/Stretcher in lowest position, wheels locked, appropriate side rails in place/Call bell, personal items and telephone in reach/Instruct patient to call for assistance before getting out of bed/chair/stretcher/Non-slip footwear applied when patient is off stretcher/[place] to call system/Physically safe environment - no spills, clutter or unnecessary equipment/Purposeful proactive rounding/Room/bathroom lighting operational, light cord in reach

## 2024-12-24 NOTE — ED PROVIDER NOTE - PROGRESS NOTE DETAILS
aJe Saldaña, PGY2 Resident:  Patient evaluated by Dr. Smyth her OB at bedside, recommended official kidney and bladder ultrasound.  Patient reassessed after all results came back, Dr. Smyth agrees with discharge.  Patient will follow-up with Dr. Smyth outpatient. Patient no longer having pain

## 2024-12-24 NOTE — ED PROVIDER NOTE - OBJECTIVE STATEMENT
34-year-old female, , 15 weeks pregnant follows with Dr. Smyth, no other past medical history, comes in for right flank pain since this morning.  Patient woke up around 3:30 AM, right-sided flank pain, reports pain is colicky in nature.  Pain level waxes and wanes in intensity.  Denies any history of kidney stones.  Denies fevers, chills, chest pain, shortness of breath, abdominal pain, dysuria, hematuria, diarrhea. No abnml vaginal bleeding or dc

## 2024-12-24 NOTE — ED PROVIDER NOTE - ATTENDING CONTRIBUTION TO CARE
34-year-old female  15 weeks 5 days pregnant complaining of right mid back pain since this morning.  Patient was awoken from sleep by pain which then improved and then she had another episode later in the morning.  Pain is constant and nonradiating.  Positive associated nausea, no vomiting.  Normal urination.  No dysuria or hematuria.  Normal BMs.  No vaginal bleeding or discharge.  No fever, chills.  No history of kidney stones.  No lower extremity paresthesias or motor weakness.  No recent injury.  Vitals noted.  CONSTITUTIONAL: Well-appearing; well-nourished; in no apparent distress.   HEAD: Normocephalic; atraumatic.   EYES: PERRL; EOM intact. Conjunctiva normal B/L.   ENT: Normal pharynx with no tonsillar hypertrophy. MMM.  NECK: Supple; non-tender; no cervical lymphadenopathy.   CHEST: Normal chest excursion with respiration.   CARDIOVASCULAR: Normal S1, S2; no murmurs, rubs, or gallops.   RESPIRATORY: Normal chest excursion with respiration; breath sounds clear and equal bilaterally; no wheezes, rhonchi, or rales.  GI/: Normal bowel sounds; non-distended; non-tender.  BACK: No evidence of trauma or deformity. Non-tender to palpation. + R CVAT   EXT: Normal ROM in all four extremities; non-tender to palpation; distal pulses are normal. No leg edema B/L.   SKIN: Normal for age and race; warm; dry; good turgor.  NEURO: A & O x 4; CN 2-12 intact. Grossly unremarkable.

## 2024-12-24 NOTE — ED PROVIDER NOTE - PHYSICAL EXAMINATION
GENERAL: Well-developed; well-nourished; in no acute distress. AO  SKIN: warm, well perfused  HEAD: Normocephalic; atraumatic.  EYES: no conjunctival erythema, ocular motions intact and appropriate  ENT: airway clear.  CARD: Regular rate and rhythm.   RESP: LCTAB; No wheezes or crackles  ABD: soft, nontender. right sided CVA tenderness to palpation. gravid abd  Upper EXT: Normal ROM. Rad pulses 2+ symm, sensation intact and symm  Lower EXT: moving b/l LEs, sensation intact and symm

## 2024-12-24 NOTE — ED PROVIDER NOTE - CLINICAL SUMMARY MEDICAL DECISION MAKING FREE TEXT BOX
34-year-old female  15 weeks 5 days pregnant complaining of right flank pain since the morning.  Patient with right CVA tenderness on exam.  All labs reviewed.  UA with no infection.  Patient evaluated by OB/GYN and recommendations appreciated.  Pelvic sono and renal sono reviewed.  Patient given Tylenol for pain.  Patient with no further flank pain while in the ED.  Patient discharged home to follow-up with OB/GYN.

## 2024-12-24 NOTE — ED PROVIDER NOTE - PATIENT PORTAL LINK FT
You can access the FollowMyHealth Patient Portal offered by [facility] by registering at the following website: [email or web address] By joining [employer]’s FollowMyHealth portal, you will also be able to view your health information using other applications (apps) compatible with our system.

## 2024-12-24 NOTE — ED PROVIDER NOTE - NSFOLLOWUPINSTRUCTIONS_ED_ALL_ED_FT
Please follow up with your primary care physician and any medical specialist(s) if they were recommended. If you've been prescribed medications, please take your medications as prescribed. Return to the emergency department if your symptoms do not resolve and/or worsen.  ------------------------------------------------------------------------------------------------------------------------  Flank Pain    WHAT YOU NEED TO KNOW:    Flank pain is felt in the area below your ribcage and above your hip bones, often in the lower back. Your pain may be dull or so severe that you cannot get comfortable. The pain may stay in one area or radiate to another area. It may worsen and lighten in waves. Flank pain is often a sign of problems with your urinary tract, such as a kidney stone or infection.    DISCHARGE INSTRUCTIONS:    Return to the emergency department if:    You have a fever.    Your heart is fluttering or jumping.    You see blood in your urine.    Your pain radiates into your lower abdomen and genital area.    You have intense pain in your low back next to your spine.    You are much more tired than usual and have no desire to eat.    You have a headache and your muscles jerk.  Contact your healthcare provider if:    You have an upset stomach and are vomiting.    You have to urinate more often, and with urgency.    Your pain worsens or does not improve, and you cannot get comfortable.    You pass a stone when you urinate.    You have questions or concerns about your condition or care.  Medicines: The following medicines may be ordered for you:    Pain medicine may help decrease or relieve your pain. Do not wait until the pain is severe before you take your medicine.    Antibiotics may help treat a urinary tract infection caused by bacteria.    Take your medicine as directed. Contact your healthcare provider if you think your medicine is not helping or if you have side effects. Tell your provider if you are allergic to any medicine. Keep a list of the medicines, vitamins, and herbs you take. Include the amounts, and when and why you take them. Bring the list or the pill bottles to follow-up visits. Carry your medicine list with you in case of an emergency.  Follow up with your healthcare provider in 1 to 2 days or as directed: Write down your questions so you remember to ask them during your visits.

## 2024-12-24 NOTE — CONSULT NOTE ADULT - ASSESSMENT
34y  at 15w4d with back pain, r/o kidney stones, clinically and hemodynamically stable    - Recommend obtaining official renal sono  - IV hydration, tylenol for pain PRN  - No acute OB intervention  - Will continue to follow, pending above work up    D/w Dr. Mccrary and Dr. Vega 34y  at 15w4d with right back pain/CVA tenderness, r/o kidney stones, clinically and hemodynamically stable    - Recommend obtaining official renal sono  - IV hydration, tylenol for pain PRN  - No acute OB intervention  - Will continue to follow, pending above work up    D/w Dr. Mccrary and Dr. Vega 34y  at 15w4d with right back pain/CVA tenderness, r/o kidney stones, clinically and hemodynamically stable    - Recommend obtaining official renal sono  - IV hydration, tylenol for pain PRN  - No acute OB intervention  - Will continue to follow, pending above work up    ADDENDUM: Renal sono unremarkable, patient cleared by GYN, encouraged hydration, precautions discussed, follow up outpatient with Dr. Vega.     D/w Dr. Mccrary and Dr. Vega

## 2024-12-24 NOTE — CONSULT NOTE ADULT - SUBJECTIVE AND OBJECTIVE BOX
PGY2 Note    Chief Complaint: back pain    HPI: 34y  at 15w4d by LMP c/w CRL KIERAN 25 presenting for back pain that woke her up from sleep last night. She describes the pain as colicky, intermittent, non radiating. She denies urinary urgency, frequency, or burning. She denies vaginal bleeding or leakage of fluid. She follows Dr. Vega for prenatal care.     Ob/Gyn History:                 LMP -  24                '  Denies history of ovarian cysts, uterine fibroids, abnormal paps, or STIs    Denies the following: constitutional symptoms, visual symptoms, cardiovascular symptoms, respiratory symptoms, GI symptoms, musculoskeletal symptoms, skin symptoms, neurologic symptoms, hematologic symptoms, allergic symptoms, psychiatric symptoms  Except any pertinent positives listed.     Home Medications: none    Allergies: No Known Allergies      PAST MEDICAL & SURGICAL HISTORY:  No pertinent past medical history  History of cholecystectomy  H/o breast lumpectomy    FAMILY HISTORY:  Family history of asthma in mother  FH: HTN (hypertension)  DAD    SOCIAL HISTORY: Denies cigarette use, alcohol use, or illicit drug use    Vital Signs Last 24 Hrs  T(F): 98.5 (24 Dec 2024 07:44), Max: 98.5 (24 Dec 2024 07:44)  HR: 75 (24 Dec 2024 07:44) (75 - 75)  BP: 104/73 (24 Dec 2024 07:44) (104/73 - 104/73)  RR: 20 (24 Dec 2024 07:44) (20 - 20)    Weight (kg): 79.4 (24 @ 07:44)    General Appearance - AAOx3, NAD  Abdomen - Soft, nontender, nondistended, no rebound, no rigidity, no guarding  GYN/Pelvis:  Ext: normal external genitalia  SVE: closed, thick, and high  TVUS: CL 4.5cm, FM visualized    Meds:       Weight (kg): 79.4 (24 @ 07:44)    LABS:                        12.3   9.88  )-----------( 326      ( 24 Dec 2024 08:35 )             36.1             135  |  103  |  6[L]  ----------------------------<  87  4.1   |  19  |  0.6[L]    Ca    9.3      24 Dec 2024 08:35    TPro  6.4  /  Alb  3.9  /  TBili  0.4  /  DBili  x   /  AST  36  /  ALT  69[H]  /  AlkPhos  49  12-24      Urinalysis Basic - ( 24 Dec 2024 08:35 )    Color: Yellow / Appearance: Clear / S.007 / pH: x  Gluc: 87 mg/dL / Ketone: Negative mg/dL  / Bili: Negative / Urobili: 0.2 mg/dL   Blood: x / Protein: Negative mg/dL / Nitrite: Negative   Leuk Esterase: Negative / RBC: x / WBC x   Sq Epi: x / Non Sq Epi: x / Bacteria: x   PGY2 Note    Chief Complaint: back pain    HPI: 34y  at 15w4d by LMP c/w CRL KIERAN 25 presenting for back pain that woke her up from sleep last night. She describes the pain as colicky, intermittent, non radiating. She denies urinary urgency, frequency, or burning. She denies vaginal bleeding or leakage of fluid. She follows Dr. Vega for prenatal care.     Ob/Gyn History:                 LMP -  24                '  Denies history of ovarian cysts, uterine fibroids, abnormal paps, or STIs    Denies the following: constitutional symptoms, visual symptoms, cardiovascular symptoms, respiratory symptoms, GI symptoms, musculoskeletal symptoms, skin symptoms, neurologic symptoms, hematologic symptoms, allergic symptoms, psychiatric symptoms  Except any pertinent positives listed.     Home Medications: none    Allergies: No Known Allergies      PAST MEDICAL & SURGICAL HISTORY:  No pertinent past medical history  History of cholecystectomy  H/o breast lumpectomy    FAMILY HISTORY:  Family history of asthma in mother  FH: HTN (hypertension)  DAD    SOCIAL HISTORY: Denies cigarette use, alcohol use, or illicit drug use    Vital Signs Last 24 Hrs  T(F): 98.5 (24 Dec 2024 07:44), Max: 98.5 (24 Dec 2024 07:44)  HR: 75 (24 Dec 2024 07:44) (75 - 75)  BP: 104/73 (24 Dec 2024 07:44) (104/73 - 104/73)  RR: 20 (24 Dec 2024 07:44) (20 - 20)    Weight (kg): 79.4 (24 @ 07:44)    General Appearance - AAOx3, NAD  Abdomen - Soft, nontender, nondistended, no rebound, no rigidity, no guarding  GYN/Pelvis:  Ext: normal external genitalia  SVE: closed, long, and high  TVUS: CL 4.5cm, FM visualized    Meds:       Weight (kg): 79.4 (24 @ 07:44)    LABS:                        12.3   9.88  )-----------( 326      ( 24 Dec 2024 08:35 )             36.1             135  |  103  |  6[L]  ----------------------------<  87  4.1   |  19  |  0.6[L]    Ca    9.3      24 Dec 2024 08:35    TPro  6.4  /  Alb  3.9  /  TBili  0.4  /  DBili  x   /  AST  36  /  ALT  69[H]  /  AlkPhos  49  12-24      Urinalysis Basic - ( 24 Dec 2024 08:35 )    Color: Yellow / Appearance: Clear / S.007 / pH: x  Gluc: 87 mg/dL / Ketone: Negative mg/dL  / Bili: Negative / Urobili: 0.2 mg/dL   Blood: x / Protein: Negative mg/dL / Nitrite: Negative   Leuk Esterase: Negative / RBC: x / WBC x   Sq Epi: x / Non Sq Epi: x / Bacteria: x

## 2024-12-27 LAB
A VAGINAE DNA VAG QL NAA+PROBE: SIGNIFICANT CHANGE UP
BVAB2 DNA VAG QL NAA+PROBE: SIGNIFICANT CHANGE UP
C ALBICANS DNA VAG QL NAA+PROBE: NEGATIVE — SIGNIFICANT CHANGE UP
C GLABRATA DNA VAG QL NAA+PROBE: NEGATIVE — SIGNIFICANT CHANGE UP
C KRUSEI DNA VAG QL NAA+PROBE: NEGATIVE — SIGNIFICANT CHANGE UP
C LUSITANIAE DNA VAG QL NAA+PROBE: NEGATIVE — SIGNIFICANT CHANGE UP
C TRACH RRNA SPEC QL NAA+PROBE: NEGATIVE — SIGNIFICANT CHANGE UP
CANDIDA DNA VAG QL NAA+PROBE: NEGATIVE — SIGNIFICANT CHANGE UP
MEGA1 DNA VAG QL NAA+PROBE: SIGNIFICANT CHANGE UP
N GONORRHOEA RRNA SPEC QL NAA+PROBE: NEGATIVE — SIGNIFICANT CHANGE UP
T VAGINALIS RRNA SPEC QL NAA+PROBE: NEGATIVE — SIGNIFICANT CHANGE UP

## 2025-01-07 ENCOUNTER — APPOINTMENT (OUTPATIENT)
Dept: OBGYN | Facility: CLINIC | Age: 35
End: 2025-01-07

## 2025-01-07 VITALS
SYSTOLIC BLOOD PRESSURE: 114 MMHG | HEIGHT: 63 IN | DIASTOLIC BLOOD PRESSURE: 73 MMHG | TEMPERATURE: 98.6 F | WEIGHT: 182 LBS | BODY MASS INDEX: 32.25 KG/M2 | HEART RATE: 102 BPM

## 2025-01-07 DIAGNOSIS — Z3A.17 17 WEEKS GESTATION OF PREGNANCY: ICD-10-CM

## 2025-01-07 DIAGNOSIS — R09.89 OTHER SPECIFIED SYMPTOMS AND SIGNS INVOLVING THE CIRCULATORY AND RESPIRATORY SYSTEMS: ICD-10-CM

## 2025-01-07 DIAGNOSIS — Z31.5 ENCOUNTER FOR PROCREATIVE GENETIC COUNSELING: ICD-10-CM

## 2025-01-07 DIAGNOSIS — O99.210 OBESITY COMPLICATING PREGNANCY, UNSPECIFIED TRIMESTER: ICD-10-CM

## 2025-01-07 DIAGNOSIS — Z34.02 ENCOUNTER FOR SUPERVISION OF NORMAL FIRST PREGNANCY, SECOND TRIMESTER: ICD-10-CM

## 2025-01-07 DIAGNOSIS — Z71.89 OTHER SPECIFIED COUNSELING: ICD-10-CM

## 2025-01-07 DIAGNOSIS — R76.8 OTHER SPECIFIED ABNORMAL IMMUNOLOGICAL FINDINGS IN SERUM: ICD-10-CM

## 2025-01-07 PROCEDURE — 0500F INITIAL PRENATAL CARE VISIT: CPT

## 2025-01-07 PROCEDURE — 81003 URINALYSIS AUTO W/O SCOPE: CPT | Mod: QW

## 2025-01-08 LAB
BILIRUB UR QL STRIP: NORMAL
CLARITY UR: CLEAR
COLLECTION METHOD: NORMAL
GLUCOSE UR-MCNC: NORMAL
HCG UR QL: 0.2 EU/DL
HGB UR QL STRIP.AUTO: ABNORMAL
KETONES UR-MCNC: ABNORMAL
LEUKOCYTE ESTERASE UR QL STRIP: NORMAL
NITRITE UR QL STRIP: NORMAL
PH UR STRIP: 6
PROT UR STRIP-MCNC: ABNORMAL
RAPID RVP RESULT: NOT DETECTED
SARS-COV-2 RNA RESP QL NAA+PROBE: NOT DETECTED
SP GR UR STRIP: >=1.03

## 2025-01-09 LAB
AFP INTERP SERPL-IMP: NORMAL
AFP INTERP SERPL-IMP: NORMAL
AFP MOM CUT-OFF: 2.5
AFP MOM SERPL: 1.06
AFP PERCENTILE: 57
AFP SERPL-ACNC: 39.25 NG/ML
CARBAMAZEPINE?: NO
CURRENT SMOKER: NO
DIABETES STATUS PATIENT: NO
GA: NORMAL
GESTATIONAL AGE METHOD: NORMAL
HX OF NTD NARR: NO
MULTIPLE PREGNANCY: NORMAL
NEURAL TUBE DEFECT RISK FETUS: NORMAL
NEURAL TUBE DEFECT RISK POP: NORMAL
RECOM F/U: NO
TEST PERFORMANCE INFO SPEC: NORMAL
VALPROIC ACID?: NO

## 2025-01-13 PROBLEM — O99.210 OBESITY IN PREGNANCY: Status: ACTIVE | Noted: 2025-01-13

## 2025-01-13 PROBLEM — Z34.02 ENCOUNTER FOR PRENATAL CARE OF FIRST PREGNANCY, ANTEPARTUM, SECOND TRIMESTER: Status: ACTIVE | Noted: 2025-01-13

## 2025-01-13 PROBLEM — Z3A.17 PREGNANCY WITH 17 COMPLETED WEEKS GESTATION: Status: ACTIVE | Noted: 2024-11-13

## 2025-01-13 PROBLEM — Z71.89 OTHER SPECIFIED COUNSELING: Status: ACTIVE | Noted: 2025-01-13

## 2025-01-13 LAB — CMV IGG AVIDITY SERPL IA-RTO: 0.88

## 2025-01-23 ENCOUNTER — NON-APPOINTMENT (OUTPATIENT)
Age: 35
End: 2025-01-23

## 2025-01-27 ENCOUNTER — APPOINTMENT (OUTPATIENT)
Dept: ANTEPARTUM | Facility: CLINIC | Age: 35
End: 2025-01-27

## 2025-01-30 ENCOUNTER — ASOB RESULT (OUTPATIENT)
Age: 35
End: 2025-01-30

## 2025-01-30 ENCOUNTER — APPOINTMENT (OUTPATIENT)
Dept: ANTEPARTUM | Facility: CLINIC | Age: 35
End: 2025-01-30
Payer: COMMERCIAL

## 2025-01-30 VITALS
HEART RATE: 102 BPM | HEIGHT: 63 IN | BODY MASS INDEX: 32.96 KG/M2 | WEIGHT: 186 LBS | SYSTOLIC BLOOD PRESSURE: 111 MMHG | DIASTOLIC BLOOD PRESSURE: 73 MMHG

## 2025-01-30 PROCEDURE — 76817 TRANSVAGINAL US OBSTETRIC: CPT

## 2025-01-30 PROCEDURE — 76811 OB US DETAILED SNGL FETUS: CPT

## 2025-02-12 ENCOUNTER — ASOB RESULT (OUTPATIENT)
Age: 35
End: 2025-02-12

## 2025-02-12 ENCOUNTER — APPOINTMENT (OUTPATIENT)
Dept: OBGYN | Facility: CLINIC | Age: 35
End: 2025-02-12

## 2025-02-12 ENCOUNTER — APPOINTMENT (OUTPATIENT)
Dept: ANTEPARTUM | Facility: CLINIC | Age: 35
End: 2025-02-12
Payer: COMMERCIAL

## 2025-02-12 VITALS
SYSTOLIC BLOOD PRESSURE: 109 MMHG | BODY MASS INDEX: 33.66 KG/M2 | WEIGHT: 190 LBS | DIASTOLIC BLOOD PRESSURE: 68 MMHG | HEART RATE: 79 BPM | HEIGHT: 63 IN | TEMPERATURE: 98.6 F

## 2025-02-12 VITALS
WEIGHT: 190 LBS | HEART RATE: 80 BPM | SYSTOLIC BLOOD PRESSURE: 117 MMHG | BODY MASS INDEX: 33.66 KG/M2 | DIASTOLIC BLOOD PRESSURE: 63 MMHG | HEIGHT: 63 IN

## 2025-02-12 LAB
BILIRUB UR QL STRIP: NORMAL
CLARITY UR: CLEAR
COLLECTION METHOD: NORMAL
GLUCOSE UR-MCNC: NORMAL
HCG UR QL: 0.2 EU/DL
HGB UR QL STRIP.AUTO: NORMAL
KETONES UR-MCNC: NORMAL
LEUKOCYTE ESTERASE UR QL STRIP: NORMAL
NITRITE UR QL STRIP: NORMAL
PH UR STRIP: 8.5
PROT UR STRIP-MCNC: ABNORMAL
SP GR UR STRIP: 1.02

## 2025-02-12 PROCEDURE — 81003 URINALYSIS AUTO W/O SCOPE: CPT | Mod: QW

## 2025-02-12 PROCEDURE — 76817 TRANSVAGINAL US OBSTETRIC: CPT

## 2025-02-12 PROCEDURE — 0502F SUBSEQUENT PRENATAL CARE: CPT | Mod: 25

## 2025-02-12 PROCEDURE — 76816 OB US FOLLOW-UP PER FETUS: CPT

## 2025-02-24 ENCOUNTER — APPOINTMENT (OUTPATIENT)
Dept: OBGYN | Facility: CLINIC | Age: 35
End: 2025-02-24
Payer: COMMERCIAL

## 2025-02-24 VITALS
HEIGHT: 63 IN | SYSTOLIC BLOOD PRESSURE: 104 MMHG | WEIGHT: 189 LBS | TEMPERATURE: 98.6 F | BODY MASS INDEX: 33.49 KG/M2 | DIASTOLIC BLOOD PRESSURE: 70 MMHG | HEART RATE: 81 BPM

## 2025-02-24 DIAGNOSIS — Z3A.24 24 WEEKS GESTATION OF PREGNANCY: ICD-10-CM

## 2025-02-24 DIAGNOSIS — Z71.89 OTHER SPECIFIED COUNSELING: ICD-10-CM

## 2025-02-24 DIAGNOSIS — Z34.02 ENCOUNTER FOR SUPERVISION OF NORMAL FIRST PREGNANCY, SECOND TRIMESTER: ICD-10-CM

## 2025-02-24 DIAGNOSIS — O99.210 OBESITY COMPLICATING PREGNANCY, UNSPECIFIED TRIMESTER: ICD-10-CM

## 2025-02-24 LAB
BASOPHILS # BLD AUTO: 0.03 K/UL
BASOPHILS NFR BLD AUTO: 0.4 %
BILIRUB UR QL STRIP: NORMAL
CLARITY UR: CLEAR
COLLECTION METHOD: NORMAL
EOSINOPHIL # BLD AUTO: 0.07 K/UL
EOSINOPHIL NFR BLD AUTO: 0.9 %
GLUCOSE 1H P 50 G GLC PO SERPL-MCNC: 190 MG/DL
GLUCOSE UR-MCNC: NORMAL
HCG UR QL: 0.2 EU/DL
HCT VFR BLD CALC: 36.8 %
HGB BLD-MCNC: 12 G/DL
HGB UR QL STRIP.AUTO: ABNORMAL
IMM GRANULOCYTES NFR BLD AUTO: 0.6 %
KETONES UR-MCNC: NORMAL
LEUKOCYTE ESTERASE UR QL STRIP: NORMAL
LYMPHOCYTES # BLD AUTO: 1.74 K/UL
LYMPHOCYTES NFR BLD AUTO: 21.4 %
MAN DIFF?: NORMAL
MCHC RBC-ENTMCNC: 30.5 PG
MCHC RBC-ENTMCNC: 32.6 G/DL
MCV RBC AUTO: 93.6 FL
MONOCYTES # BLD AUTO: 0.42 K/UL
MONOCYTES NFR BLD AUTO: 5.2 %
NEUTROPHILS # BLD AUTO: 5.81 K/UL
NEUTROPHILS NFR BLD AUTO: 71.5 %
NITRITE UR QL STRIP: NORMAL
PH UR STRIP: 7
PLATELET # BLD AUTO: 316 K/UL
PMV BLD AUTO: 0 /100 WBCS
PMV BLD: 9.8 FL
PROT UR STRIP-MCNC: NORMAL
RBC # BLD: 3.93 M/UL
RBC # FLD: 12.9 %
SP GR UR STRIP: 1.02
WBC # FLD AUTO: 8.12 K/UL

## 2025-02-24 PROCEDURE — 81003 URINALYSIS AUTO W/O SCOPE: CPT | Mod: QW

## 2025-02-24 PROCEDURE — 0502F SUBSEQUENT PRENATAL CARE: CPT

## 2025-02-25 ENCOUNTER — NON-APPOINTMENT (OUTPATIENT)
Age: 35
End: 2025-02-25

## 2025-02-25 DIAGNOSIS — O99.810 ABNORMAL GLUCOSE COMPLICATING PREGNANCY: ICD-10-CM

## 2025-03-05 ENCOUNTER — APPOINTMENT (OUTPATIENT)
Dept: PEDIATRIC CARDIOLOGY | Facility: CLINIC | Age: 35
End: 2025-03-05
Payer: COMMERCIAL

## 2025-03-05 PROCEDURE — 76825 ECHO EXAM OF FETAL HEART: CPT

## 2025-03-05 PROCEDURE — 76827 ECHO EXAM OF FETAL HEART: CPT

## 2025-03-05 PROCEDURE — 93325 DOPPLER ECHO COLOR FLOW MAPG: CPT

## 2025-03-05 PROCEDURE — 99205 OFFICE O/P NEW HI 60 MIN: CPT

## 2025-03-24 ENCOUNTER — APPOINTMENT (OUTPATIENT)
Dept: OBGYN | Facility: CLINIC | Age: 35
End: 2025-03-24
Payer: COMMERCIAL

## 2025-03-24 ENCOUNTER — APPOINTMENT (OUTPATIENT)
Dept: OBGYN | Facility: CLINIC | Age: 35
End: 2025-03-24

## 2025-03-24 VITALS
HEART RATE: 92 BPM | BODY MASS INDEX: 33.84 KG/M2 | TEMPERATURE: 98.6 F | WEIGHT: 191 LBS | SYSTOLIC BLOOD PRESSURE: 120 MMHG | DIASTOLIC BLOOD PRESSURE: 70 MMHG | HEIGHT: 63 IN

## 2025-03-24 DIAGNOSIS — Z3A.28 28 WEEKS GESTATION OF PREGNANCY: ICD-10-CM

## 2025-03-24 DIAGNOSIS — O26.899 OTHER SPECIFIED PREGNANCY RELATED CONDITIONS, UNSPECIFIED TRIMESTER: ICD-10-CM

## 2025-03-24 DIAGNOSIS — O99.210 OBESITY COMPLICATING PREGNANCY, UNSPECIFIED TRIMESTER: ICD-10-CM

## 2025-03-24 DIAGNOSIS — Z71.89 OTHER SPECIFIED COUNSELING: ICD-10-CM

## 2025-03-24 DIAGNOSIS — O99.810 ABNORMAL GLUCOSE COMPLICATING PREGNANCY: ICD-10-CM

## 2025-03-24 DIAGNOSIS — Z34.03 ENCOUNTER FOR SUPERVISION OF NORMAL FIRST PREGNANCY, THIRD TRIMESTER: ICD-10-CM

## 2025-03-24 DIAGNOSIS — R10.9 OTHER SPECIFIED PREGNANCY RELATED CONDITIONS, UNSPECIFIED TRIMESTER: ICD-10-CM

## 2025-03-24 DIAGNOSIS — Z23 ENCOUNTER FOR IMMUNIZATION: ICD-10-CM

## 2025-03-24 LAB
BILIRUB UR QL STRIP: NORMAL
CLARITY UR: CLEAR
COLLECTION METHOD: NORMAL
GLUCOSE UR-MCNC: ABNORMAL
HCG UR QL: 0.2 EU/DL
HGB UR QL STRIP.AUTO: NORMAL
KETONES UR-MCNC: NORMAL
LEUKOCYTE ESTERASE UR QL STRIP: NORMAL
NITRITE UR QL STRIP: NORMAL
PH UR STRIP: 6.5
PROT UR STRIP-MCNC: NORMAL
SP GR UR STRIP: 1.01

## 2025-03-24 PROCEDURE — 81003 URINALYSIS AUTO W/O SCOPE: CPT | Mod: QW

## 2025-03-24 PROCEDURE — 0502F SUBSEQUENT PRENATAL CARE: CPT | Mod: 25

## 2025-03-24 PROCEDURE — 90471 IMMUNIZATION ADMIN: CPT

## 2025-03-24 PROCEDURE — 90715 TDAP VACCINE 7 YRS/> IM: CPT

## 2025-03-24 PROCEDURE — 76817 TRANSVAGINAL US OBSTETRIC: CPT | Mod: 59

## 2025-03-26 DIAGNOSIS — O24.419 GESTATIONAL DIABETES MELLITUS IN PREGNANCY, UNSPECIFIED CONTROL: ICD-10-CM

## 2025-03-26 RX ORDER — BLOOD-GLUCOSE METER
70 EACH MISCELLANEOUS
Qty: 120 | Refills: 4 | Status: ACTIVE | COMMUNITY
Start: 2025-03-26

## 2025-03-26 RX ORDER — BLOOD-GLUCOSE METER
W/DEVICE KIT MISCELLANEOUS
Qty: 1 | Refills: 0 | Status: ACTIVE | COMMUNITY
Start: 2025-03-26

## 2025-03-26 RX ORDER — BLOOD SUGAR DIAGNOSTIC
STRIP MISCELLANEOUS 4 TIMES DAILY
Qty: 120 | Refills: 4 | Status: ACTIVE | COMMUNITY
Start: 2025-03-26

## 2025-03-26 RX ORDER — LANCETS 28 GAUGE
EACH MISCELLANEOUS
Qty: 120 | Refills: 4 | Status: ACTIVE | COMMUNITY
Start: 2025-03-26

## 2025-03-31 ENCOUNTER — NON-APPOINTMENT (OUTPATIENT)
Age: 35
End: 2025-03-31

## 2025-04-07 ENCOUNTER — APPOINTMENT (OUTPATIENT)
Dept: OBGYN | Facility: CLINIC | Age: 35
End: 2025-04-07

## 2025-04-07 ENCOUNTER — APPOINTMENT (OUTPATIENT)
Dept: OBGYN | Facility: CLINIC | Age: 35
End: 2025-04-07
Payer: COMMERCIAL

## 2025-04-07 VITALS
BODY MASS INDEX: 34.55 KG/M2 | DIASTOLIC BLOOD PRESSURE: 78 MMHG | WEIGHT: 195 LBS | TEMPERATURE: 98.6 F | HEIGHT: 63 IN | SYSTOLIC BLOOD PRESSURE: 116 MMHG | HEART RATE: 92 BPM

## 2025-04-07 DIAGNOSIS — O09.90 SUPERVISION OF HIGH RISK PREGNANCY, UNSPECIFIED, UNSPECIFIED TRIMESTER: ICD-10-CM

## 2025-04-07 DIAGNOSIS — O99.210 OBESITY COMPLICATING PREGNANCY, UNSPECIFIED TRIMESTER: ICD-10-CM

## 2025-04-07 DIAGNOSIS — Z3A.30 30 WEEKS GESTATION OF PREGNANCY: ICD-10-CM

## 2025-04-07 DIAGNOSIS — O24.419 GESTATIONAL DIABETES MELLITUS IN PREGNANCY, UNSPECIFIED CONTROL: ICD-10-CM

## 2025-04-07 DIAGNOSIS — Z71.89 OTHER SPECIFIED COUNSELING: ICD-10-CM

## 2025-04-07 DIAGNOSIS — Z34.03 ENCOUNTER FOR SUPERVISION OF NORMAL FIRST PREGNANCY, THIRD TRIMESTER: ICD-10-CM

## 2025-04-07 LAB
BILIRUB UR QL STRIP: NORMAL
CLARITY UR: NORMAL
COLLECTION METHOD: NORMAL
GLUCOSE UR-MCNC: ABNORMAL
HCG UR QL: 0.2 EU/DL
HGB UR QL STRIP.AUTO: ABNORMAL
KETONES UR-MCNC: NORMAL
LEUKOCYTE ESTERASE UR QL STRIP: NORMAL
NITRITE UR QL STRIP: NORMAL
PH UR STRIP: 6.5
PROT UR STRIP-MCNC: ABNORMAL
SP GR UR STRIP: 1.03

## 2025-04-07 PROCEDURE — 81003 URINALYSIS AUTO W/O SCOPE: CPT | Mod: QW

## 2025-04-07 PROCEDURE — 0502F SUBSEQUENT PRENATAL CARE: CPT

## 2025-04-08 LAB
CREAT SPEC-SCNC: 169 MG/DL
CREAT/PROT UR: 0.2 RATIO
PROT UR-MCNC: 31 MG/DL

## 2025-04-15 PROBLEM — O09.90 HIGH RISK PREGNANCY, ANTEPARTUM: Status: ACTIVE | Noted: 2025-02-25

## 2025-04-15 PROBLEM — Z3A.30 PREGNANCY WITH 30 COMPLETED WEEKS GESTATION: Status: ACTIVE | Noted: 2024-11-13

## 2025-04-21 ENCOUNTER — APPOINTMENT (OUTPATIENT)
Dept: OBGYN | Facility: CLINIC | Age: 35
End: 2025-04-21

## 2025-04-21 VITALS
HEIGHT: 63 IN | WEIGHT: 197 LBS | SYSTOLIC BLOOD PRESSURE: 106 MMHG | BODY MASS INDEX: 34.91 KG/M2 | DIASTOLIC BLOOD PRESSURE: 72 MMHG | HEART RATE: 96 BPM | TEMPERATURE: 98.6 F

## 2025-04-21 LAB
BILIRUB UR QL STRIP: NORMAL
CLARITY UR: CLEAR
COLLECTION METHOD: NORMAL
GLUCOSE UR-MCNC: ABNORMAL
HCG UR QL: 0.2 EU/DL
HGB UR QL STRIP.AUTO: ABNORMAL
KETONES UR-MCNC: ABNORMAL
LEUKOCYTE ESTERASE UR QL STRIP: NORMAL
NITRITE UR QL STRIP: NORMAL
PH UR STRIP: 6.5
PROT UR STRIP-MCNC: ABNORMAL
SP GR UR STRIP: >=1.03

## 2025-04-21 PROCEDURE — XXXXX: CPT | Mod: 1L

## 2025-04-21 PROCEDURE — ZZZZZ: CPT | Mod: 1L

## 2025-04-23 ENCOUNTER — ASOB RESULT (OUTPATIENT)
Age: 35
End: 2025-04-23

## 2025-04-23 ENCOUNTER — APPOINTMENT (OUTPATIENT)
Dept: ANTEPARTUM | Facility: CLINIC | Age: 35
End: 2025-04-23
Payer: COMMERCIAL

## 2025-04-23 ENCOUNTER — NON-APPOINTMENT (OUTPATIENT)
Age: 35
End: 2025-04-23

## 2025-04-23 VITALS
SYSTOLIC BLOOD PRESSURE: 105 MMHG | BODY MASS INDEX: 35.26 KG/M2 | DIASTOLIC BLOOD PRESSURE: 68 MMHG | WEIGHT: 199 LBS | HEART RATE: 91 BPM | HEIGHT: 63 IN

## 2025-04-23 DIAGNOSIS — Z3A.30 30 WEEKS GESTATION OF PREGNANCY: ICD-10-CM

## 2025-04-23 DIAGNOSIS — Z31.5 ENCOUNTER FOR PROCREATIVE GENETIC COUNSELING: ICD-10-CM

## 2025-04-23 DIAGNOSIS — Z71.89 OTHER SPECIFIED COUNSELING: ICD-10-CM

## 2025-04-23 DIAGNOSIS — O26.899 OTHER SPECIFIED PREGNANCY RELATED CONDITIONS, UNSPECIFIED TRIMESTER: ICD-10-CM

## 2025-04-23 DIAGNOSIS — R09.89 OTHER SPECIFIED SYMPTOMS AND SIGNS INVOLVING THE CIRCULATORY AND RESPIRATORY SYSTEMS: ICD-10-CM

## 2025-04-23 DIAGNOSIS — Q83.1 ACCESSORY BREAST: ICD-10-CM

## 2025-04-23 DIAGNOSIS — Z87.42 PERSONAL HISTORY OF OTHER DISEASES OF THE FEMALE GENITAL TRACT: ICD-10-CM

## 2025-04-23 DIAGNOSIS — R10.9 OTHER SPECIFIED PREGNANCY RELATED CONDITIONS, UNSPECIFIED TRIMESTER: ICD-10-CM

## 2025-04-23 DIAGNOSIS — Z87.19 PERSONAL HISTORY OF OTHER DISEASES OF THE DIGESTIVE SYSTEM: ICD-10-CM

## 2025-04-23 PROCEDURE — 76816 OB US FOLLOW-UP PER FETUS: CPT

## 2025-04-23 PROCEDURE — 76818 FETAL BIOPHYS PROFILE W/NST: CPT | Mod: 59

## 2025-04-24 ENCOUNTER — NON-APPOINTMENT (OUTPATIENT)
Age: 35
End: 2025-04-24

## 2025-04-30 ENCOUNTER — ASOB RESULT (OUTPATIENT)
Age: 35
End: 2025-04-30

## 2025-04-30 ENCOUNTER — APPOINTMENT (OUTPATIENT)
Dept: ANTEPARTUM | Facility: CLINIC | Age: 35
End: 2025-04-30
Payer: COMMERCIAL

## 2025-04-30 ENCOUNTER — APPOINTMENT (OUTPATIENT)
Dept: MATERNAL FETAL MEDICINE | Facility: CLINIC | Age: 35
End: 2025-04-30
Payer: COMMERCIAL

## 2025-04-30 VITALS
BODY MASS INDEX: 35.26 KG/M2 | HEIGHT: 63 IN | HEART RATE: 102 BPM | SYSTOLIC BLOOD PRESSURE: 103 MMHG | WEIGHT: 199 LBS | DIASTOLIC BLOOD PRESSURE: 71 MMHG

## 2025-04-30 DIAGNOSIS — Z3A.33 33 WEEKS GESTATION OF PREGNANCY: ICD-10-CM

## 2025-04-30 DIAGNOSIS — O24.419 GESTATIONAL DIABETES MELLITUS IN PREGNANCY, UNSPECIFIED CONTROL: ICD-10-CM

## 2025-04-30 DIAGNOSIS — Z78.9 OTHER SPECIFIED HEALTH STATUS: ICD-10-CM

## 2025-04-30 DIAGNOSIS — Z3A.32 32 WEEKS GESTATION OF PREGNANCY: ICD-10-CM

## 2025-04-30 DIAGNOSIS — O99.210 OBESITY COMPLICATING PREGNANCY, UNSPECIFIED TRIMESTER: ICD-10-CM

## 2025-04-30 DIAGNOSIS — O09.90 SUPERVISION OF HIGH RISK PREGNANCY, UNSPECIFIED, UNSPECIFIED TRIMESTER: ICD-10-CM

## 2025-04-30 PROCEDURE — 76818 FETAL BIOPHYS PROFILE W/NST: CPT

## 2025-04-30 PROCEDURE — ZZZZZ: CPT

## 2025-04-30 PROCEDURE — 99204 OFFICE O/P NEW MOD 45 MIN: CPT | Mod: 25

## 2025-05-05 ENCOUNTER — APPOINTMENT (OUTPATIENT)
Dept: OBGYN | Facility: CLINIC | Age: 35
End: 2025-05-05

## 2025-05-05 ENCOUNTER — NON-APPOINTMENT (OUTPATIENT)
Age: 35
End: 2025-05-05

## 2025-05-05 VITALS
SYSTOLIC BLOOD PRESSURE: 116 MMHG | WEIGHT: 198 LBS | HEART RATE: 97 BPM | BODY MASS INDEX: 35.08 KG/M2 | HEIGHT: 63 IN | TEMPERATURE: 98.6 F | DIASTOLIC BLOOD PRESSURE: 70 MMHG

## 2025-05-05 LAB
BILIRUB UR QL STRIP: NORMAL
CLARITY UR: CLEAR
COLLECTION METHOD: NORMAL
GLUCOSE UR-MCNC: NORMAL
HCG UR QL: 1 EU/DL
HGB UR QL STRIP.AUTO: NORMAL
KETONES UR-MCNC: NORMAL
LEUKOCYTE ESTERASE UR QL STRIP: NORMAL
NITRITE UR QL STRIP: NORMAL
PH UR STRIP: 7
PROT UR STRIP-MCNC: ABNORMAL
SP GR UR STRIP: 1.02

## 2025-05-05 PROCEDURE — XXXXX: CPT | Mod: 1L

## 2025-05-07 ENCOUNTER — APPOINTMENT (OUTPATIENT)
Dept: ANTEPARTUM | Facility: CLINIC | Age: 35
End: 2025-05-07

## 2025-05-14 ENCOUNTER — ASOB RESULT (OUTPATIENT)
Age: 35
End: 2025-05-14

## 2025-05-14 ENCOUNTER — APPOINTMENT (OUTPATIENT)
Dept: ANTEPARTUM | Facility: CLINIC | Age: 35
End: 2025-05-14
Payer: COMMERCIAL

## 2025-05-14 VITALS
DIASTOLIC BLOOD PRESSURE: 80 MMHG | HEART RATE: 108 BPM | BODY MASS INDEX: 35.26 KG/M2 | WEIGHT: 199 LBS | SYSTOLIC BLOOD PRESSURE: 123 MMHG | HEIGHT: 63 IN

## 2025-05-14 DIAGNOSIS — Z3A.33 33 WEEKS GESTATION OF PREGNANCY: ICD-10-CM

## 2025-05-14 PROBLEM — Z3A.35 35 WEEKS GESTATION OF PREGNANCY: Status: ACTIVE | Noted: 2025-05-14

## 2025-05-14 PROCEDURE — 99211 OFF/OP EST MAY X REQ PHY/QHP: CPT | Mod: 25

## 2025-05-14 PROCEDURE — 76818 FETAL BIOPHYS PROFILE W/NST: CPT

## 2025-05-19 ENCOUNTER — INPATIENT (INPATIENT)
Facility: HOSPITAL | Age: 35
LOS: 0 days | Discharge: HOME | DRG: 833 | End: 2025-05-20
Attending: OBSTETRICS & GYNECOLOGY | Admitting: OBSTETRICS & GYNECOLOGY
Payer: COMMERCIAL

## 2025-05-19 ENCOUNTER — APPOINTMENT (OUTPATIENT)
Dept: OBGYN | Facility: CLINIC | Age: 35
End: 2025-05-19

## 2025-05-19 VITALS
BODY MASS INDEX: 35.26 KG/M2 | WEIGHT: 199 LBS | TEMPERATURE: 98.6 F | HEIGHT: 63 IN | HEART RATE: 108 BPM | DIASTOLIC BLOOD PRESSURE: 76 MMHG | SYSTOLIC BLOOD PRESSURE: 111 MMHG

## 2025-05-19 VITALS — DIASTOLIC BLOOD PRESSURE: 56 MMHG | SYSTOLIC BLOOD PRESSURE: 102 MMHG | HEART RATE: 78 BPM

## 2025-05-19 DIAGNOSIS — O26.893 OTHER SPECIFIED PREGNANCY RELATED CONDITIONS, THIRD TRIMESTER: ICD-10-CM

## 2025-05-19 DIAGNOSIS — Z98.890 OTHER SPECIFIED POSTPROCEDURAL STATES: Chronic | ICD-10-CM

## 2025-05-19 DIAGNOSIS — Z90.49 ACQUIRED ABSENCE OF OTHER SPECIFIED PARTS OF DIGESTIVE TRACT: Chronic | ICD-10-CM

## 2025-05-19 LAB
A1C WITH ESTIMATED AVERAGE GLUCOSE RESULT: 5.7 % — HIGH (ref 4–5.6)
ALBUMIN SERPL ELPH-MCNC: 3.8 G/DL — SIGNIFICANT CHANGE UP (ref 3.5–5.2)
ALP SERPL-CCNC: 167 U/L — HIGH (ref 30–115)
ALT FLD-CCNC: 23 U/L — SIGNIFICANT CHANGE UP (ref 0–41)
ANION GAP SERPL CALC-SCNC: 12 MMOL/L — SIGNIFICANT CHANGE UP (ref 7–14)
APPEARANCE UR: ABNORMAL
AST SERPL-CCNC: 17 U/L — SIGNIFICANT CHANGE UP (ref 0–41)
BASOPHILS # BLD AUTO: 0.02 K/UL — SIGNIFICANT CHANGE UP (ref 0–0.2)
BASOPHILS NFR BLD AUTO: 0.2 % — SIGNIFICANT CHANGE UP (ref 0–1)
BILIRUB SERPL-MCNC: 0.4 MG/DL — SIGNIFICANT CHANGE UP (ref 0.2–1.2)
BILIRUB UR-MCNC: NEGATIVE — SIGNIFICANT CHANGE UP
BUN SERPL-MCNC: 8 MG/DL — LOW (ref 10–20)
CALCIUM SERPL-MCNC: 9.5 MG/DL — SIGNIFICANT CHANGE UP (ref 8.4–10.5)
CHLORIDE SERPL-SCNC: 103 MMOL/L — SIGNIFICANT CHANGE UP (ref 98–110)
CO2 SERPL-SCNC: 19 MMOL/L — SIGNIFICANT CHANGE UP (ref 17–32)
COLOR SPEC: YELLOW — SIGNIFICANT CHANGE UP
CREAT ?TM UR-MCNC: 129 MG/DL — SIGNIFICANT CHANGE UP
CREAT SERPL-MCNC: 0.6 MG/DL — LOW (ref 0.7–1.5)
DIFF PNL FLD: NEGATIVE — SIGNIFICANT CHANGE UP
EGFR: 122 ML/MIN/1.73M2 — SIGNIFICANT CHANGE UP
EGFR: 122 ML/MIN/1.73M2 — SIGNIFICANT CHANGE UP
EOSINOPHIL # BLD AUTO: 0.06 K/UL — SIGNIFICANT CHANGE UP (ref 0–0.7)
EOSINOPHIL NFR BLD AUTO: 0.7 % — SIGNIFICANT CHANGE UP (ref 0–8)
ESTIMATED AVERAGE GLUCOSE: 117 MG/DL — HIGH (ref 68–114)
GLUCOSE BLDC GLUCOMTR-MCNC: 136 MG/DL — HIGH (ref 70–99)
GLUCOSE BLDC GLUCOMTR-MCNC: 71 MG/DL — SIGNIFICANT CHANGE UP (ref 70–99)
GLUCOSE BLDC GLUCOMTR-MCNC: 85 MG/DL — SIGNIFICANT CHANGE UP (ref 70–99)
GLUCOSE SERPL-MCNC: 83 MG/DL — SIGNIFICANT CHANGE UP (ref 70–99)
GLUCOSE UR QL: 500 MG/DL
HCT VFR BLD CALC: 38.1 % — SIGNIFICANT CHANGE UP (ref 37–47)
HGB BLD-MCNC: 13.1 G/DL — SIGNIFICANT CHANGE UP (ref 12–16)
HIV 1 & 2 AB SERPL IA.RAPID: SIGNIFICANT CHANGE UP
IMM GRANULOCYTES NFR BLD AUTO: 0.6 % — HIGH (ref 0.1–0.3)
KETONES UR QL: ABNORMAL MG/DL
LDH SERPL L TO P-CCNC: 212 — SIGNIFICANT CHANGE UP (ref 50–242)
LEUKOCYTE ESTERASE UR-ACNC: ABNORMAL
LYMPHOCYTES # BLD AUTO: 1.66 K/UL — SIGNIFICANT CHANGE UP (ref 1.2–3.4)
LYMPHOCYTES # BLD AUTO: 18.6 % — LOW (ref 20.5–51.1)
MCHC RBC-ENTMCNC: 31 PG — SIGNIFICANT CHANGE UP (ref 27–31)
MCHC RBC-ENTMCNC: 34.4 G/DL — SIGNIFICANT CHANGE UP (ref 32–37)
MCV RBC AUTO: 90.3 FL — SIGNIFICANT CHANGE UP (ref 81–99)
MONOCYTES # BLD AUTO: 0.69 K/UL — HIGH (ref 0.1–0.6)
MONOCYTES NFR BLD AUTO: 7.7 % — SIGNIFICANT CHANGE UP (ref 1.7–9.3)
NEUTROPHILS # BLD AUTO: 6.43 K/UL — SIGNIFICANT CHANGE UP (ref 1.4–6.5)
NEUTROPHILS NFR BLD AUTO: 72.2 % — SIGNIFICANT CHANGE UP (ref 42.2–75.2)
NITRITE UR-MCNC: NEGATIVE — SIGNIFICANT CHANGE UP
NRBC BLD AUTO-RTO: 0 /100 WBCS — SIGNIFICANT CHANGE UP (ref 0–0)
PH UR: 7 — SIGNIFICANT CHANGE UP (ref 5–8)
PLATELET # BLD AUTO: 328 K/UL — SIGNIFICANT CHANGE UP (ref 130–400)
PMV BLD: 10.1 FL — SIGNIFICANT CHANGE UP (ref 7.4–10.4)
POTASSIUM SERPL-MCNC: 4.4 MMOL/L — SIGNIFICANT CHANGE UP (ref 3.5–5)
POTASSIUM SERPL-SCNC: 4.4 MMOL/L — SIGNIFICANT CHANGE UP (ref 3.5–5)
PRENATAL SYPHILIS TEST: SIGNIFICANT CHANGE UP
PROT ?TM UR-MCNC: 37 MG/DL — SIGNIFICANT CHANGE UP
PROT SERPL-MCNC: 6.4 G/DL — SIGNIFICANT CHANGE UP (ref 6–8)
PROT UR-MCNC: 30 MG/DL
PROT/CREAT UR-RTO: 0.3 RATIO — HIGH (ref 0–0.2)
RBC # BLD: 4.22 M/UL — SIGNIFICANT CHANGE UP (ref 4.2–5.4)
RBC # FLD: 13.1 % — SIGNIFICANT CHANGE UP (ref 11.5–14.5)
SODIUM SERPL-SCNC: 134 MMOL/L — LOW (ref 135–146)
SP GR SPEC: 1.03 — SIGNIFICANT CHANGE UP (ref 1–1.03)
URATE SERPL-MCNC: 3.6 MG/DL — SIGNIFICANT CHANGE UP (ref 2.5–7)
UROBILINOGEN FLD QL: 1 MG/DL — SIGNIFICANT CHANGE UP (ref 0.2–1)
WBC # BLD: 8.91 K/UL — SIGNIFICANT CHANGE UP (ref 4.8–10.8)
WBC # FLD AUTO: 8.91 K/UL — SIGNIFICANT CHANGE UP (ref 4.8–10.8)

## 2025-05-19 RX ORDER — OXYTOCIN-SODIUM CHLORIDE 0.9% IV SOLN 30 UNIT/500ML 30-0.9/5 UT/ML-%
167 SOLUTION INTRAVENOUS
Qty: 30 | Refills: 0 | Status: DISCONTINUED | OUTPATIENT
Start: 2025-05-19 | End: 2025-05-20

## 2025-05-19 RX ORDER — SODIUM CHLORIDE 9 G/1000ML
1000 INJECTION, SOLUTION INTRAVENOUS
Refills: 0 | Status: DISCONTINUED | OUTPATIENT
Start: 2025-05-19 | End: 2025-05-20

## 2025-05-19 RX ORDER — AMPICILLIN SODIUM 1 G/1
1 INJECTION, POWDER, FOR SOLUTION INTRAMUSCULAR; INTRAVENOUS EVERY 4 HOURS
Refills: 0 | Status: DISCONTINUED | OUTPATIENT
Start: 2025-05-19 | End: 2025-05-19

## 2025-05-19 RX ORDER — PENICILLIN G POTASSIUM 5000000 [IU]/1
5 INJECTION, POWDER, FOR SOLUTION INTRAMUSCULAR; INTRAVENOUS ONCE
Refills: 0 | Status: DISCONTINUED | OUTPATIENT
Start: 2025-05-19 | End: 2025-05-19

## 2025-05-19 RX ORDER — AMPICILLIN SODIUM 1 G/1
2 INJECTION, POWDER, FOR SOLUTION INTRAMUSCULAR; INTRAVENOUS ONCE
Refills: 0 | Status: DISCONTINUED | OUTPATIENT
Start: 2025-05-19 | End: 2025-05-19

## 2025-05-19 RX ORDER — PENICILLIN V POTASSIUM 250 MG
3 TABLET ORAL EVERY 4 HOURS
Refills: 0 | Status: DISCONTINUED | OUTPATIENT
Start: 2025-05-19 | End: 2025-05-20

## 2025-05-19 RX ORDER — GLYCERIN
1 LIQUID (ML) MISCELLANEOUS ONCE
Refills: 0 | Status: COMPLETED | OUTPATIENT
Start: 2025-05-19 | End: 2025-05-19

## 2025-05-19 RX ORDER — PENICILLIN V POTASSIUM 250 MG
5 TABLET ORAL ONCE
Refills: 0 | Status: COMPLETED | OUTPATIENT
Start: 2025-05-19 | End: 2025-05-19

## 2025-05-19 RX ADMIN — Medication 1 SUPPOSITORY(S): at 21:15

## 2025-05-19 RX ADMIN — Medication 100 MILLION UNIT(S): at 18:39

## 2025-05-19 RX ADMIN — Medication 100 MILLION UNIT(S): at 22:33

## 2025-05-19 RX ADMIN — SODIUM CHLORIDE 125 MILLILITER(S): 9 INJECTION, SOLUTION INTRAVENOUS at 21:15

## 2025-05-19 RX ADMIN — SODIUM CHLORIDE 125 MILLILITER(S): 9 INJECTION, SOLUTION INTRAVENOUS at 19:04

## 2025-05-19 NOTE — OB RN PATIENT PROFILE - NSTOBACCONEVERSMOKERY/N_GEN_A
2019       RE: Akila Monte  [address] [place] [address]     Dear Colleague,    Thank you for referring your patient, Akila Monte, to the [facility] EAR NOSE [facility]ROAT at [facility]. Please see a copy of my visit note below.      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 She is doing well today and does not have new concerns.     3/26/2018 Optical tracking system endoscopic sinus surgery (Bilateral) Procedure: OPTICAL TRACKING SYSTEM ENDOSCOPIC SINUS SURGERY; Stealth guided Bilateral maxillary antrostomy and right sphenoidotomy with cultures ; Surgeon: Brigitte Flood MD; Location:  OR     Review of Systems:   Pertinent items are noted in HPI or as in patient entered ROS below, remainder of complete ROS is negative.    ENT ROS 3/18/2019   Neurology -   Ears, Nose, Throat Nasal congestion or drainage, Sore throat, Trouble swallowing, Hoarseness   Cardiopulmonary -         Physical Exam:   There were no vitals taken for this visit.     General Assessment   The patient is alert, oriented and in no acute distress.   Head/Face/Scalp  Grossly normal.   Nose  External nose is straight, skin is normal.     Procedure:  Endoscopy indicated for exam, cleaning, and Meropenum instillation.   Topical anesthetic/decongestant spray applied.  Rigid scope used for visualization.  Findings:Dry Secretions debrided with suction. Clear middle meatus bilaterally - no purulent secretions.  Meropenem 2 mL was applied to each maxillary antrum.      Assessment and Plan:  There are no diagnoses linked to this  encounterJazz Wilburn is here for nasal cleaning and Meropenum instillation. She has been doing well. She has some increased dryness today and I recommended increased saline nasal irrigation.     Follow-up: See me monthly.          Scribe Disclosure:  I, Haley Figueroa, am serving as a scribe to document services personally performed by Brigitte Flood MD at this visit, based upon the provider's statements to me. All documentation has been reviewed by the aforementioned provider prior to being entered into the official medical record.     The documentation recorded by the scribe accurately reflects the services I personally performed and the decisions made by me.  Brigitte Flood MD          No

## 2025-05-19 NOTE — OB PROVIDER TRIAGE NOTE - ATTENDING COMMENTS
33yo  @ 36+ wks was sent from office for prolonged monitoring and evaluation due to regular  CTXs with premature cervical dilation noted in office, SVE: 3/70/-2.  The patient also noted that she was not feeling well and feeling the tightening and CTXs but also has stated she hadn't felt the baby move much from early morning.  She denies VB or LOF.  In office BPP 4/8 for no movement or respiratory effort. 33yo  @ 36+ wks was sent from office for prolonged monitoring and evaluation due to regular  CTXs with premature cervical dilation noted in office, SVE: 3/70/-2.  The patient also noted that she was not feeling well and feeling the tightening and CTXs but also has stated she hadn't felt the baby move much from early morning.  She denies VB or LOF.  In office BPP  for no movement or respiratory effort.  She was asked to go to hospital and waited for a family member to pick her up from office as she did not feel well enough to drive.  She was also noted to have increased elevated fasting blood glucose levels on her log and was counseled on recommendation for starting insulin which she declined at that time.  strict glucose control for herself and fetal wellbeing were discussed.      When patient arrived she was noted to continue to have regular CTXs and she did start feeling baby move some but still decrease from baseline.  When I arrived I examined patient to assess for PTL:    SVE: /-2   patient counseled change noted form office exam but will continue to monitor due to  status if she continue to make cervical change.  She also was noted to have 2 spontaneous decelerations that resolved spontaneously.  We discussed recommendation for admission dn prolonged monitoring to further assess fetal wellbeing, glucose control and PTL.  Patient understood all counseling and all questions answered satisfactorily and she is in agreement with mgmt plan.     FHT: 120s, moderate variability, + accelerations, decel as above, CTXs q 6 minutes    A: 33yo  @ 36+ wks with  CTXs and cervical dilation to r/o PTL, GDM for glucose control    P: continue to monitor closely      admission, third trimester, preeclampsia and a1c labs      IV hydration      NPO and diet as indicated      continuous fetla monitoring      IV pen G       venodyne compression while in bed      glucose monitoring and mgmt as needed if patient accepts      will reassess in a few hours to reevaluate mgmt plan or PRN

## 2025-05-19 NOTE — OB PROVIDER TRIAGE NOTE - NSHPPHYSICALEXAM_GEN_ALL_CORE
Physical exam:    Vital Signs Last 24 Hrs  HR: 78 (19 May 2025 14:59) (78 - 78)  BP: 102/56 (19 May 2025 14:59) (102/56 - 102/56)    Gen: AAOx3, NAD  Heart: RRR, S1 S2 WNL  Lungs: CTAB  Abdomen: Soft, nontender, no distension, gravid  Ext: No calf tenderness, no swelling    EFM: 130/mod/+accels  toco: q4-5m  SVE: 3/70/-2  BSS: vertex, anterior placenta, MVP 5.3cm, BPP 8/8, EFW 2594g  EFW by leopold: [id number]

## 2025-05-19 NOTE — OB PROVIDER TRIAGE NOTE - NSHPLABSRESULTS_GEN_ALL_CORE
11/23    MMRV immune   HIV NR   Hep B NR   HCV NR   B pos   antibody screen neg  RPR NR    2/24      3/24  GTT 84/187/172/160    sonos:   32w5d- vertex, anterior placenta, MVP 5.66cm, BPP 10/10, EFW 2185g (62%tile)   22w5d- breech, anterior placenta, VIRY wnl, limited views of fetal heart --> echo wnl   20w6d- vertex, anterior placenta, MVP 4.72cm, EFW 391g, incomplete visualization, no major malformations   12w5d- NT wnl

## 2025-05-19 NOTE — OB PROVIDER TRIAGE NOTE - NSPROVTRIAGESELHIDDEN_OBGYN_ALL_OB_FT
Michael Hemmerling  : 1940  PCP: Malinda Sam MD     Reason for Visit:       Chief Complaint   Patient presents with    2nd degree avb, intermittent CHB    Bifascicular block       here for medtronic pacer implant today         History of Present Illness:  Michael Hemmerling is a 84 year old man with PMHx of successful aflutter ablation in .   He has HTN, hyperlipidemia, normal LVEF and chronic dyspnea (following L lung removal for cancer).  He has had nighttime mobitz I heart block for a few years.  Holter a few weeks ago showed transient CHB with multiple blocked p waves and a resultant 4 second ventricular pause.   EKG today shows sinus with 1:1 conduction with bifascicular block (RBBB and LPHB).  He denies angina, CP, palpitations, presyncoipe or syncope.  His dyspnea is unchanged.  He comes in to talk about his holter results.         PMHx:  History - past medical        Past Medical History:   Diagnosis Date    Arthritis      Fernando's esophagus       low-grade dysplasia    Borderline hyperglycemia 05/15/2018    Chronic right shoulder pain 2018    Gastroesophageal reflux disease       Fernando's    Hemorrhage of gastrointestinal tract 2020    Hypertension, essential 2022    Lung cancer  (CMD)      Open-angle glaucoma      Urinary incontinence       frequency            PSHx:  History - past surgical         Past Surgical History:   Procedure Laterality Date    Ablation atrial flutter - cv   08/10/2022    Cataract extrac w/ intraocular lens imp&ant vit,bilaterl        Colonoscopy   2020     tubular adenoma on pathology of polypectomy    Colonoscopy diagnostic   2024     [facility] Brooks.EGD / Colonoscopy. Recall Colonoscopy 5 years.    Ep study/atrial flutter ablation - cv   2022    Esophagogastroduodenoscopy transoral flex w/bx single or mult   2020     light microscopy along with biopsy and ablation    Hernia repair         Paraesophageal     Joint  replacement         L hip 2004    Lung cancer surgery Left 01/10/2000    Lung removal, partial        Removal gallbladder        Upper gastrointestinal endoscopy   05/2020            Family Hx:  History - family         Family History   Problem Relation Age of Onset    Diabetes Mother      Myocardial Infarction Father              Social Hx:   reports that he quit smoking about 39 years ago. His smoking use included cigarettes. He started smoking about 69 years ago. He has a 60 pack-year smoking history. He has been exposed to tobacco smoke. He has never used smokeless tobacco. He reports that he does not drink alcohol and does not use drugs.     Allergies:    ALLERGIES:     No Known Allergies        Medications:  Medications          Current Outpatient Medications   Medication Sig Dispense Refill    albuterol 108 (90 Base) MCG/ACT inhaler Inhale 2 puffs into the lungs every 4 hours as needed for Shortness of Breath or Wheezing. 3 each 3    fluticasone-vilanterol (Breo Ellipta) 100-25 MCG/ACT inhaler Inhale 1 puff into the lungs daily. 3 each 3    omeprazole (PriLOSEC) 40 MG capsule Take 1 capsule by mouth daily. 90 capsule 3    simvastatin (ZOCOR) 40 MG tablet Take 1 tablet by mouth daily. 90 tablet 3    tamsulosin (FLOMAX) 0.4 MG Cap Take 1 capsule by mouth daily. 90 capsule 3    triamterene-hydroCHLOROthiazide (MAXZIDE-25) 37.5-25 MG per tablet Take 1 tablet by mouth daily. 90 tablet 3    triamcinolone (KENALOG) 0.5 % ointment Apply topically 3 times daily. To L elbow 30 g 5    ibuprofen (MOTRIN) 800 MG tablet TAKE 1 TABLET BY MOUTH AS NEEDED FOR PAIN NOT TO EXCEED4 TABLETS PER DAY        aspirin 325 MG EC tablet Take 1 tablet by mouth daily. 90 tablet 3    Ferrous Sulfate (Iron) 325 (65 Fe) MG Tab Take 65 mg by mouth daily.        ketoconazole (NIZORAL) 2 % cream Apply 1 application topically as needed. To feet        amoxicillin (AMOXIL) 500 MG capsule Take 2,000 mg by mouth. 4 capsules prior to dental  procedures        Wheat Dextrin (Benefiber On The GO) Pack Take 1 packet by mouth daily. In 4 oz of water or juice. (Patient taking differently: Take 1 packet by mouth daily as needed. In 4 oz of water or juice.) 100 each 3    Cyanocobalamin (B-12) 1000 MCG Cap Take 1,000 mcg by mouth daily.        Multiple Vitamins-Minerals (CENTRUM SILVER ADULT 50+) Tab Take 1 tablet by mouth daily.        latanoprost (XALATAN) 0.005 % ophthalmic solution Place 1 drop into both eyes nightly.          No current facility-administered medications for this visit.            Review of Systems:  Review of Systems   Constitutional: Negative for chills, fever, weight gain and weight loss.   HENT:  Negative for hearing loss.    Eyes:  Negative for double vision.        Patient denies significant visual changes   Cardiovascular:  Positive for dyspnea on exertion. Negative for chest pain, claudication, near-syncope, palpitations and syncope.        Negative except for what's indicated in HPI   Respiratory:  Positive for shortness of breath. Negative for cough and hemoptysis.         S/p L lung lobectomy, SKINNER / SOB chronic and unchanged   Hematologic/Lymphatic: Does not bruise/bleed easily.   Skin:  Negative for rash and suspicious lesions.   Musculoskeletal:  Negative for arthritis, falls and joint pain.   Gastrointestinal:  Negative for hematochezia and melena.   Genitourinary:  Negative for hematuria.   Neurological:  Negative for dizziness, focal weakness and weakness.        No localized deficits   Psychiatric/Behavioral:  Negative for altered mental status and depression.    Allergic/Immunologic: Negative for environmental allergies.        No new food allergies      All other systems were reviewed and were negative.         Physical Exam:  Visit Vitals  /69 (BP Location: LUE - Left upper extremity, Patient Position: Sitting, Cuff Size: Regular)   Pulse 78   Ht 5' 11\" (1.803 m)   Wt 95.7 kg (210 lb 15.7 oz)   BMI 29.43 kg/m²          Gen: no acute distress, comfortable  Neuro: alert and oriented x 3  HEENT: symmetric  Mouth:  membranes moist  Neck: no jvd  CV: regular s1s2 no murmur  Pulm: clear  GI: soft, non-tender, no rebound or guarding  :  no cva tenderness  Gait:  normal  Ext: no edema  Skin: no rashes        Data:   ECHO 4/22:  EF 58%, grade 2 diastolic dysfunction.   PA 40 mmHg.  Possible aflutter     Labs 3/22: LDL 76;  Hgb 14;  Creat 1.05;  LFTs normal     HOLTER 5/22:  afib / aflutter throughout, average HR was 98 bpm, range 37 to 149 bpm.    Longest pause 3.3 seconds.        Successful aflutter ablation 8/22     Stress thallium 1/23:  EF 67%, no ischemia     HOLTER 1/23:  Yane patch, sinus 76, range 24 to 114 bpm.   Slowest HR at 1am.  Rare transient CHB during sleep hours midnight to 5:30am, longest pause 3.6 seconds.  No daytime ananya.   Atrial ectopy 4%, no afib or aflutter, PVCs < 1%.        HOLTER 8/23:   average HR 78 bpm;  intermittent wenkebach, mostly during sleeping hours.  No long pauses and no daytime bradycardia     Holter 9/24:  NSR 78, range 29 to 130 bpm.  Intermittent high degree avb, longest pause 4 seconds.  Slowest HRs usually during sleep.  Atrial 0.3%, vent < 0.1%.        labs 11/22:  LDL 78;  Hgb 14;   Creat 1.0;  LFTs normal     eliquis stopped 2/23        Assessment/Plan:  Michael Hemmerling is a 84 year old man with PMHx of successful aflutter ablation in 2022.   He has HTN, hyperlipidemia, normal LVEF and chronic dyspnea (following L lung removal for cancer).  He has had nighttime mobitz I heart block for a few years.  Holter a few weeks ago showed transient CHB with multiple blocked p waves and a resultant 4 second ventricular pause.   EKG today shows sinus with 1:1 conduction with bifascicular block (RBBB and LPHB).  He denies angina, CP, palpitations, presyncoipe or syncope.  His dyspnea is unchanged.  He comes in to talk about his holter results.         Diagnosis:      Patient Active Problem List    Diagnosis    Anemia, iron deficiency    Balance disorder    Fernando's esophagus without dysplasia    Chronic obstructive pulmonary disease  (CMD)    Mild cognitive disorder    Diastolic dysfunction    Enlarged prostate with lower urinary tract symptoms (LUTS)    Gastroesophageal reflux disease    Glaucoma    Primary osteoarthritis of right knee    Peripheral neuropathy    Ptosis, bilateral    Vitamin B 12 deficiency    Hip joint replacement by other means    Primary open angle glaucoma (POAG) of both eyes    Bilateral leg edema    Dysplasia of toenail    Typical atrial flutter  (CMD)    Hypertension, essential    Hyperlipidemia, mixed    Status post ablation of atrial flutter    Mobitz type I Wenckebach atrioventricular block    Dyspnea on exertion    History of lung cancer    Intermittent complete heart block  (CMD)            IMPRESSION:  Intermittent high degree avb  s/p aflutter ablation 8/22  Nighttime 2nd degree wenkebach  Hyperlipidemia, LDL at goal  HTN  Diastolic dysfunction  Dyspnea on exertion  s/p L lung resection (cancer)              PLAN:     EKG today (ananya):  NSR at 78, RBBB, LPHB     Monitor 9/24 results reviewed, intermittent high degree avb with pauses up to 4 seconds     Risks benefits alt to pacer discussed including bleed, infection, ptx, perforation, etc.       Pt is agreeable to pacer implant, wishes to proceed.      Labs today:  cmp cbc lipids mg     Will plan on a Medtronic pacer at [facility] in the next couple of weeks     Alexis Krueger MD   [NS_AttendInformed_OBGYN_ALL_OB:[id number]=]

## 2025-05-19 NOTE — OB PROVIDER TRIAGE NOTE - CURRENT PREGNANCY COMPLICATIONS, OB PROFILE
Gestational Diabetes/Maternal Unknown GBS  CTXs with premature cervical dilation/Gestational Diabetes/Maternal Unknown GBS

## 2025-05-19 NOTE — OB RN TRIAGE NOTE - FALL HARM RISK - UNIVERSAL INTERVENTIONS
Bed in lowest position, wheels locked, appropriate side rails in place/Call bell, personal items and telephone in reach/Instruct patient to call for assistance before getting out of bed or chair/Non-slip footwear when patient is out of bed/[place] to call system/Physically safe environment - no spills, clutter or unnecessary equipment/Purposeful Proactive Rounding/Room/bathroom lighting operational, light cord in reach

## 2025-05-19 NOTE — OB PROVIDER TRIAGE NOTE - NSOBPROVIDERNOTE_OBGYN_ALL_OB_FT
A/P: 35yo  at 36w3d, GBS unknown, presenting for prolonged monitoring after BPP of  in the office today and regular contractions.    -continuous efm/toco  -IVF  -f/u PELs, a1c, GBS, vaginitis  -re-examine    Discussed with Dr. Vega and Dr. Ortega

## 2025-05-19 NOTE — OB RN PATIENT PROFILE - FALL HARM RISK - UNIVERSAL INTERVENTIONS
Bed in lowest position, wheels locked, appropriate side rails in place/Call bell, personal items and telephone in reach/Instruct patient to call for assistance before getting out of bed or chair/Non-slip footwear when patient is out of bed/[place] to call system/Physically safe environment - no spills, clutter or unnecessary equipment/Purposeful Proactive Rounding/Room/bathroom lighting operational, light cord in reach

## 2025-05-19 NOTE — OB PROVIDER TRIAGE NOTE - HISTORY OF PRESENT ILLNESS
35yo  at 36w3d, KIERAN 25 by LMP c/w early sono, presenting to L&D for prolonged monitoring.  Patient was seen in the office today and had a BPP that was 4/8 (-2 for no movement, -2 for no breathing).  Patient reports good fetal movement and contractions q4-5mins.  Denies LOF, VB.  Patient was found to be 3cm in the office today. GBS unknown.    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  Patient was seen in the office today and had a BPP that was 4/8 (-2 for no movement, -2 for no breathing).  Patient reports decreased fetal movement and contractions q4-5mins.  Denies LOF, VB.  Patient was found to be 3cm in the office today. GBS unknown.    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

## 2025-05-20 ENCOUNTER — TRANSCRIPTION ENCOUNTER (OUTPATIENT)
Age: 35
End: 2025-05-20

## 2025-05-20 VITALS — SYSTOLIC BLOOD PRESSURE: 118 MMHG | HEART RATE: 82 BPM | DIASTOLIC BLOOD PRESSURE: 65 MMHG

## 2025-05-20 LAB
GLUCOSE BLDC GLUCOMTR-MCNC: 119 MG/DL — HIGH (ref 70–99)
GLUCOSE BLDC GLUCOMTR-MCNC: 131 MG/DL — HIGH (ref 70–99)
GLUCOSE BLDC GLUCOMTR-MCNC: 88 MG/DL — SIGNIFICANT CHANGE UP (ref 70–99)
GLUCOSE BLDC GLUCOMTR-MCNC: 89 MG/DL — SIGNIFICANT CHANGE UP (ref 70–99)

## 2025-05-20 PROCEDURE — G0378: CPT

## 2025-05-20 PROCEDURE — 82962 GLUCOSE BLOOD TEST: CPT

## 2025-05-20 RX ORDER — INSULIN HUMAN 100 [IU]/ML
(70-30) 100 INJECTION, SUSPENSION SUBCUTANEOUS
Qty: 1 | Refills: 3 | Status: ACTIVE | COMMUNITY
Start: 2025-05-20 | End: 1900-01-01

## 2025-05-20 RX ORDER — DIBUCAINE 10 MG/G
1 OINTMENT TOPICAL EVERY 4 HOURS
Refills: 0 | Status: DISCONTINUED | OUTPATIENT
Start: 2025-05-20 | End: 2025-05-20

## 2025-05-20 RX ORDER — SODIUM CHLORIDE 9 G/1000ML
1000 INJECTION, SOLUTION INTRAVENOUS
Refills: 0 | Status: DISCONTINUED | OUTPATIENT
Start: 2025-05-20 | End: 2025-05-20

## 2025-05-20 RX ORDER — DEXTROSE 50 % IN WATER 50 %
12.5 SYRINGE (ML) INTRAVENOUS ONCE
Refills: 0 | Status: DISCONTINUED | OUTPATIENT
Start: 2025-05-20 | End: 2025-05-20

## 2025-05-20 RX ORDER — DEXTROSE 50 % IN WATER 50 %
25 SYRINGE (ML) INTRAVENOUS ONCE
Refills: 0 | Status: DISCONTINUED | OUTPATIENT
Start: 2025-05-20 | End: 2025-05-20

## 2025-05-20 RX ORDER — DEXTROSE 50 % IN WATER 50 %
15 SYRINGE (ML) INTRAVENOUS ONCE
Refills: 0 | Status: DISCONTINUED | OUTPATIENT
Start: 2025-05-20 | End: 2025-05-20

## 2025-05-20 RX ORDER — PHENYLEPHRINE HYDROCHLORIDE AND FAT, HARD .00525; 1.86 G/1; G/1
1 SUPPOSITORY RECTAL
Refills: 0 | Status: DISCONTINUED | OUTPATIENT
Start: 2025-05-20 | End: 2025-05-20

## 2025-05-20 RX ORDER — GLUCAGON 3 MG/1
1 POWDER NASAL ONCE
Refills: 0 | Status: DISCONTINUED | OUTPATIENT
Start: 2025-05-20 | End: 2025-05-20

## 2025-05-20 RX ORDER — HYDROCORTISONE 10 MG/G
1 CREAM TOPICAL ONCE
Refills: 0 | Status: DISCONTINUED | OUTPATIENT
Start: 2025-05-20 | End: 2025-05-20

## 2025-05-20 RX ADMIN — SODIUM CHLORIDE 125 MILLILITER(S): 9 INJECTION, SOLUTION INTRAVENOUS at 09:18

## 2025-05-20 RX ADMIN — Medication 100 MILLION UNIT(S): at 14:00

## 2025-05-20 RX ADMIN — DIBUCAINE 1 APPLICATION(S): 10 OINTMENT TOPICAL at 18:27

## 2025-05-20 RX ADMIN — DIBUCAINE 1 APPLICATION(S): 10 OINTMENT TOPICAL at 14:00

## 2025-05-20 RX ADMIN — Medication 100 MILLION UNIT(S): at 10:43

## 2025-05-20 RX ADMIN — Medication 100 MILLION UNIT(S): at 06:03

## 2025-05-20 RX ADMIN — Medication 4 UNIT(S): at 10:30

## 2025-05-20 RX ADMIN — Medication 100 MILLION UNIT(S): at 01:57

## 2025-05-20 RX ADMIN — Medication 100 MILLION UNIT(S): at 18:27

## 2025-05-20 NOTE — DISCHARGE NOTE OB - PATIENT PORTAL LINK FT
You can access the FollowMyHealth Patient Portal offered by [facility] by registering at the following website: [email or web address]. By joining [facility]’s FollowMyHealth portal, you will also be able to view your health information using other applications (apps) compatible with our system.

## 2025-05-20 NOTE — DISCHARGE NOTE OB - DO NOT DIET OR “STARVE” YOURSELF INTO GETTING BACK TO PRE-PREGNANCY SHAPE
Rx sent
[facility] Pharmacist called regarding RX for Atorvastatin 40 mg, informing that 2 sets of directions were sent. Please clarify and resubmit order electronically.       Last seen 6/17/2022  Next appt 9/2/2022  [facility]/[place]
Statement Selected

## 2025-05-20 NOTE — PROGRESS NOTE ADULT - ASSESSMENT
35yo  at 36w4d, GBS unknown, prolonged monitoring after BPP of , r/o  labor.     #GDMA1  -continued FS monitoring  -f/u a1c    #Fetal Monitoring  -reassuring maternal and fetal status at this time  -continuous efm/toco  -repeat BPP in AM    #R/o  labor  -IVF  -f/u GBS, vaginitis  -re-examine in the AM

## 2025-05-20 NOTE — CHART NOTE - NSCHARTNOTEFT_GEN_A_CORE
PGY 4     At patient bedside. Reports contractions have improved and are less frequent. SVE 4/80/-2, v, i. Will let patient ambulate and shower.     D/w Dr Vega

## 2025-05-20 NOTE — OB PROVIDER H&P - NSLOWPPHRISK_OBGYN_A_OB
No previous uterine incision/[place] Pregnancy/Less than or equal to 4 previous vaginal births/No known bleeding disorder/No history of postpartum hemorrhage/No other PPH risks indicated

## 2025-05-20 NOTE — OB PROVIDER H&P - ATTENDING COMMENTS
33yo  @ 36+ wks admitted for observation and to r/o PTL, she denies VB or lOF and states now feeling some FM, but still decreased from routine.  She did not have CTXs over night but since AM has been having regular CTXs q 4-8 minutes and feels them.  She also had elevated fasting blood glucose this morning and had previously declined insulin mgmt for elevated glucose but is amenable to take PRN for now.  Will give NPH and monitor- diabetic diet counseled.     Vitals WNL    BPP this AM     FHT: 120s, moderate variability, + accelerations, no decelerations, CTXs q 4-8 minutes    SVE: /-2    Patient counseled will get glucose more controlled and due to  contractions with premature cervical dilation will continue to monitor for labor. As she is unchanged currently will allow DM diet and continue IV abx for unknown GBS for now.  Will reassess as clinically indicated and patient given strict labor precautions.  She understands all counseling and will reassess.     A: 33yo  @ 36 wks with  CTXs and cervical dilation- r/o PTL, GDMA2 for medical mgmt and stabilization    P: continue to monitor closely      IV hydration      continuous fetal monitoring      IV pen g q 4 until PTL ruled out or delivery-       DM diet- monitor glucose levels      NPH at bedtime and insulin coverage PRN- will give dose in AM due to elevated fasting glucose      f/u labs and cultures      venodyne compression while in bed      will reassess as clinically indicated

## 2025-05-20 NOTE — DISCHARGE NOTE ANTEPARTUM - FINANCIAL ASSISTANCE
[facility] provides services at a reduced cost to those who are determined to be eligible through [facility]’s financial assistance program. Information regarding [facility]’s financial assistance program can be found by going to [email or web address] or by calling [phone].

## 2025-05-20 NOTE — DISCHARGE NOTE ANTEPARTUM - CARE PLAN
1 Principal Discharge DX:	Gestational diabetes  Assessment and plan of treatment:	Check FS fasting and two hours after meal. Take NPH 4 units subcutaneously at bedtime.

## 2025-05-20 NOTE — DISCHARGE NOTE ANTEPARTUM - PATIENT PORTAL LINK FT
You can access the FollowMyHealth Patient Portal offered by [facility] by registering at the following website: [email or web address]. By joining [facility]’s FollowMyHealth portal, you will also be able to view your health information using other applications (apps) compatible with our system.

## 2025-05-20 NOTE — DISCHARGE NOTE OB - FINANCIAL ASSISTANCE
[facility] provides services at a reduced cost to those who are determined to be eligible through [facility]’s financial assistance program. Information regarding [facility]’s financial assistance program can be found by going to [email or web address] or by calling [phone].

## 2025-05-20 NOTE — DISCHARGE NOTE ANTEPARTUM - NS MD DC FALL RISK RISK
For information on Fall & Injury Prevention, visit: [email or web address] OR  [email or web address] OR  https://www.cdc.gov/steadi/patient.html

## 2025-05-20 NOTE — DISCHARGE NOTE ANTEPARTUM - HOSPITAL COURSE
S/p prolonged monitoring. SVE unchanged over 12 hourly. Pt in early labor. Fasting FS elevated, will dc home with PM insulin.

## 2025-05-20 NOTE — OB PROVIDER H&P - NSHPPHYSICALEXAM_GEN_ALL_CORE
Approval Details    Authorization number: :[id number]   Authorized from May 14, 2023 to June 12, 2024   Information entered manually Physical exam:    Vital Signs Last 24 Hrs  HR: 78 (19 May 2025 14:59) (78 - 78)  BP: 102/56 (19 May 2025 14:59) (102/56 - 102/56)    Gen: AAOx3, NAD  Heart: RRR, S1 S2 WNL  Lungs: CTAB  Abdomen: Soft, nontender, no distension, gravid  Ext: No calf tenderness, no swelling    EFM: 130/mod/+accels  toco: q4-5m  SVE: 3/70/-2  BSS: vertex, anterior placenta, MVP 5.3cm, BPP 8/8, EFW 2594g  EFW by leopold: [id number]

## 2025-05-20 NOTE — DISCHARGE NOTE ANTEPARTUM - VAGINAL BLEEDING
[facility] ([facility]). 
Statement Selected

## 2025-05-20 NOTE — OB PROVIDER H&P - ASSESSMENT
35yo  at 36w4d, GBS unknown, prolonged monitoring after BPP of , r/o  labor.     #GDMA1  -continued FS monitoring  -f/u a1c  -AM  --> NPH 4ux1     #Fetal Monitoring  -reassuring maternal and fetal status at this time  -continuous efm/toco  -AM BPP     #R/o  labor  -IVF  -f/u GBS, vaginitis  -AM exam unchanged

## 2025-05-20 NOTE — PROGRESS NOTE ADULT - SUBJECTIVE AND OBJECTIVE BOX
PGY1 Progress Note    Patient seen and examined at bedside, no current complaints. Denies contraction pain, vaginal bleeding, leakage of fluid. Endorses good fetal movements.     Vital Signs Last 24 Hrs  T(C): 36.4 (19 May 2025 19:15), Max: 36.9 (19 May 2025 15:05)  T(F): 97.52 (19 May 2025 19:15), Max: 98.4 (19 May 2025 15:05)  HR: 78 (20 May 2025 03:12) (75 - 86)  BP: 108/66 (20 May 2025 03:12) (102/56 - 110/70)  RR: 16 (19 May 2025 19:15) (16 - 18)    Parameters below as of 19 May 2025 15:05  Patient On (Oxygen Delivery Method): room air    Labs:                        13.1   8.91  )-----------( 328      ( 19 May 2025 15:05 )             38.1         134[L]  |  103  |  8[L]  ----------------------------<  83  4.4   |  19  |  0.6[L]    Ca    9.5      19 May 2025 15:00    TPro  6.4  /  Alb  3.8  /  TBili  0.4  /  DBili  x   /  AST  17  /  ALT  23  /  AlkPhos  167[H]  -    ABO RH Interpretation: B POS (25 @ 14:22)    Urinalysis Basic - ( 19 May 2025 15:07 )    Color: Yellow / Appearance: Cloudy / S.026 / pH: x  Gluc: x / Ketone: x  / Bili: Negative / Urobili: 1.0 mg/dL   Blood: x / Protein: 30 mg/dL / Nitrite: Negative   Leuk Esterase: Trace / RBC: 4 /HPF / WBC 27 /HPF   Sq Epi: x / Non Sq Epi: 6 /HPF / Bacteria: Moderate /HPF            A/P:   yo GP at wd, GBS, no complications, in labor.   - current management  - continuous toco/EFM  - pain management PRN  - ampicillin for GBS ppx  -     Dr. Dave minor,  to be made aware

## 2025-05-20 NOTE — DISCHARGE NOTE ANTEPARTUM - CARE PROVIDER_API CALL
Joann Vega  Obstetrics and Gynecology  [address] [place] [address], [address]  [place] [address]  Phone: [phone]  Fax: [phone]  Follow Up Time:

## 2025-05-20 NOTE — OB PROVIDER H&P - HISTORY OF PRESENT ILLNESS
33yo  at 36w4d, KIERAN 25 by LMP c/w early sono, presenting to L&D for prolonged monitoring.  Patient was seen in the office yesterday and had a BPP that was 4/8 (-2 for no movement, -2 for no breathing). Patient reports good fetal movement and contractions q4-5mins. Denies LOF, VB. Patient was found to be 3cm in the office yesterday. Patient subsequently 4cm, but has not made further change. GBS unknown.    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

## 2025-05-21 ENCOUNTER — APPOINTMENT (OUTPATIENT)
Dept: ANTEPARTUM | Facility: CLINIC | Age: 35
End: 2025-05-21
Payer: COMMERCIAL

## 2025-05-21 ENCOUNTER — ASOB RESULT (OUTPATIENT)
Age: 35
End: 2025-05-21

## 2025-05-21 VITALS
SYSTOLIC BLOOD PRESSURE: 112 MMHG | DIASTOLIC BLOOD PRESSURE: 74 MMHG | WEIGHT: 203 LBS | HEART RATE: 93 BPM | BODY MASS INDEX: 35.97 KG/M2 | HEIGHT: 63 IN

## 2025-05-21 DIAGNOSIS — Z3A.35 35 WEEKS GESTATION OF PREGNANCY: ICD-10-CM

## 2025-05-21 PROBLEM — Z3A.36 36 WEEKS GESTATION OF PREGNANCY: Status: ACTIVE | Noted: 2025-05-21

## 2025-05-21 PROBLEM — O40.3XX0 POLYHYDRAMNIOS IN THIRD TRIMESTER COMPLICATION, SINGLE OR UNSPECIFIED FETUS: Status: ACTIVE | Noted: 2025-05-21

## 2025-05-21 LAB
BV BACTERIA RRNA VAG QL NAA+PROBE: SIGNIFICANT CHANGE UP
C GLABRATA RNA VAG QL NAA+PROBE: SIGNIFICANT CHANGE UP
C TRACH RRNA SPEC QL NAA+PROBE: SIGNIFICANT CHANGE UP
CANDIDA RRNA VAG QL PROBE: SIGNIFICANT CHANGE UP
GROUP B BETA STREP DNA (PCR): SIGNIFICANT CHANGE UP
N GONORRHOEA RRNA SPEC QL NAA+PROBE: SIGNIFICANT CHANGE UP
SOURCE GROUP B STREP: SIGNIFICANT CHANGE UP
T VAGINALIS RRNA SPEC QL NAA+PROBE: SIGNIFICANT CHANGE UP

## 2025-05-21 PROCEDURE — 76818 FETAL BIOPHYS PROFILE W/NST: CPT | Mod: 59

## 2025-05-21 PROCEDURE — 76816 OB US FOLLOW-UP PER FETUS: CPT

## 2025-05-21 PROCEDURE — 99211 OFF/OP EST MAY X REQ PHY/QHP: CPT | Mod: 25

## 2025-05-21 RX ORDER — PEN NEEDLE, DIABETIC 29 G X1/2"
31G X 5 MM NEEDLE, DISPOSABLE MISCELLANEOUS
Qty: 120 | Refills: 5 | Status: ACTIVE | COMMUNITY
Start: 2025-05-21 | End: 1900-01-01

## 2025-05-21 RX ORDER — GLUCAGON 1 MG
1 KIT INJECTION
Qty: 1 | Refills: 1 | Status: ACTIVE | COMMUNITY
Start: 2025-05-21 | End: 1900-01-01

## 2025-05-21 RX ORDER — DEXTROSE 4 G
4-6 TABLET,CHEWABLE ORAL
Qty: 4 | Refills: 1 | Status: ACTIVE | COMMUNITY
Start: 2025-05-21 | End: 1900-01-01

## 2025-05-22 LAB
25(OH)D3 SERPL-MCNC: 20 NG/ML
BILIRUB UR QL STRIP: NORMAL
CLARITY UR: NORMAL
COLLECTION METHOD: NORMAL
CREAT SPEC-SCNC: 156 MG/DL
CREAT/PROT UR: 0.2 RATIO
GLUCOSE UR-MCNC: ABNORMAL
HCG UR QL: 0.2 EU/DL
HGB UR QL STRIP.AUTO: NORMAL
KETONES UR-MCNC: ABNORMAL
LEUKOCYTE ESTERASE UR QL STRIP: NORMAL
NITRITE UR QL STRIP: NORMAL
PH UR STRIP: 6.5
PROT UR STRIP-MCNC: ABNORMAL
PROT UR-MCNC: 34 MG/DL
SP GR UR STRIP: 1.03
T PALLIDUM AB SER QL IA: NEGATIVE

## 2025-05-22 RX ORDER — HUMAN INSULIN 100 [IU]/ML
(70-30) 100 INJECTION, SUSPENSION SUBCUTANEOUS
Qty: 1 | Refills: 3 | Status: ACTIVE | COMMUNITY
Start: 2025-05-22

## 2025-05-23 PROBLEM — E55.9 VITAMIN D INSUFFICIENCY: Status: ACTIVE | Noted: 2025-05-23

## 2025-05-27 ENCOUNTER — APPOINTMENT (OUTPATIENT)
Dept: OBGYN | Facility: CLINIC | Age: 35
End: 2025-05-27
Payer: COMMERCIAL

## 2025-05-27 ENCOUNTER — NON-APPOINTMENT (OUTPATIENT)
Age: 35
End: 2025-05-27

## 2025-05-27 ENCOUNTER — APPOINTMENT (OUTPATIENT)
Dept: OBGYN | Facility: CLINIC | Age: 35
End: 2025-05-27

## 2025-05-27 ENCOUNTER — ASOB RESULT (OUTPATIENT)
Age: 35
End: 2025-05-27

## 2025-05-27 DIAGNOSIS — Z34.03 ENCOUNTER FOR SUPERVISION OF NORMAL FIRST PREGNANCY, THIRD TRIMESTER: ICD-10-CM

## 2025-05-27 DIAGNOSIS — E55.9 VITAMIN D DEFICIENCY, UNSPECIFIED: ICD-10-CM

## 2025-05-27 DIAGNOSIS — O24.419 GESTATIONAL DIABETES MELLITUS IN PREGNANCY, UNSPECIFIED CONTROL: ICD-10-CM

## 2025-05-27 LAB — B-HEM STREP SPEC QL CULT: NORMAL

## 2025-05-27 PROCEDURE — 0502F SUBSEQUENT PRENATAL CARE: CPT | Mod: 25

## 2025-05-27 PROCEDURE — 81003 URINALYSIS AUTO W/O SCOPE: CPT | Mod: QW

## 2025-05-27 PROCEDURE — 76819 FETAL BIOPHYS PROFIL W/O NST: CPT

## 2025-05-27 RX ORDER — UBIDECARENONE/VIT E ACET 100MG-5
25 MCG CAPSULE ORAL
Qty: 30 | Refills: 3 | Status: ACTIVE | COMMUNITY
Start: 2025-05-23

## 2025-05-28 ENCOUNTER — APPOINTMENT (OUTPATIENT)
Dept: MATERNAL FETAL MEDICINE | Facility: CLINIC | Age: 35
End: 2025-05-28
Payer: COMMERCIAL

## 2025-05-28 ENCOUNTER — ASOB RESULT (OUTPATIENT)
Age: 35
End: 2025-05-28

## 2025-05-28 ENCOUNTER — NON-APPOINTMENT (OUTPATIENT)
Age: 35
End: 2025-05-28

## 2025-05-28 ENCOUNTER — APPOINTMENT (OUTPATIENT)
Dept: ANTEPARTUM | Facility: CLINIC | Age: 35
End: 2025-05-28
Payer: COMMERCIAL

## 2025-05-28 VITALS
DIASTOLIC BLOOD PRESSURE: 73 MMHG | HEIGHT: 63 IN | HEART RATE: 109 BPM | SYSTOLIC BLOOD PRESSURE: 104 MMHG | WEIGHT: 203 LBS | BODY MASS INDEX: 35.97 KG/M2

## 2025-05-28 DIAGNOSIS — O40.3XX0 POLYHYDRAMNIOS, THIRD TRIMESTER, NOT APPLICABLE OR UNSPECIFIED: ICD-10-CM

## 2025-05-28 DIAGNOSIS — Z3A.37 37 WEEKS GESTATION OF PREGNANCY: ICD-10-CM

## 2025-05-28 DIAGNOSIS — O99.210 OBESITY COMPLICATING PREGNANCY, UNSPECIFIED TRIMESTER: ICD-10-CM

## 2025-05-28 DIAGNOSIS — O09.90 SUPERVISION OF HIGH RISK PREGNANCY, UNSPECIFIED, UNSPECIFIED TRIMESTER: ICD-10-CM

## 2025-05-28 PROCEDURE — ZZZZZ: CPT

## 2025-05-28 PROCEDURE — 76818 FETAL BIOPHYS PROFILE W/NST: CPT

## 2025-05-28 PROCEDURE — 99214 OFFICE O/P EST MOD 30 MIN: CPT | Mod: 25

## 2025-05-29 LAB
BILIRUB UR QL STRIP: NORMAL
CLARITY UR: CLEAR
COLLECTION METHOD: NORMAL
GLUCOSE UR-MCNC: ABNORMAL
HCG UR QL: 0.2 EU/DL
HGB UR QL STRIP.AUTO: NORMAL
KETONES UR-MCNC: ABNORMAL
LEUKOCYTE ESTERASE UR QL STRIP: NORMAL
NITRITE UR QL STRIP: NORMAL
PH UR STRIP: 7
PROT UR STRIP-MCNC: ABNORMAL
SP GR UR STRIP: 1.02

## 2025-05-30 ENCOUNTER — APPOINTMENT (OUTPATIENT)
Dept: MATERNAL FETAL MEDICINE | Facility: CLINIC | Age: 35
End: 2025-05-30
Payer: COMMERCIAL

## 2025-05-30 ENCOUNTER — INPATIENT (INPATIENT)
Facility: HOSPITAL | Age: 35
LOS: 1 days | Discharge: HOME | DRG: 833 | End: 2025-06-01
Attending: OBSTETRICS & GYNECOLOGY | Admitting: OBSTETRICS & GYNECOLOGY
Payer: COMMERCIAL

## 2025-05-30 ENCOUNTER — APPOINTMENT (OUTPATIENT)
Dept: ANTEPARTUM | Facility: CLINIC | Age: 35
End: 2025-05-30
Payer: COMMERCIAL

## 2025-05-30 ENCOUNTER — ASOB RESULT (OUTPATIENT)
Age: 35
End: 2025-05-30

## 2025-05-30 VITALS — HEART RATE: 89 BPM | SYSTOLIC BLOOD PRESSURE: 116 MMHG | DIASTOLIC BLOOD PRESSURE: 68 MMHG

## 2025-05-30 VITALS
HEART RATE: 109 BPM | DIASTOLIC BLOOD PRESSURE: 82 MMHG | SYSTOLIC BLOOD PRESSURE: 118 MMHG | BODY MASS INDEX: 36.32 KG/M2 | HEIGHT: 63 IN | WEIGHT: 205 LBS

## 2025-05-30 DIAGNOSIS — O61.0 FAILED MEDICAL INDUCTION OF LABOR: ICD-10-CM

## 2025-05-30 DIAGNOSIS — O36.8130 DECREASED FETAL MOVEMENTS, THIRD TRIMESTER, NOT APPLICABLE OR UNSPECIFIED: ICD-10-CM

## 2025-05-30 DIAGNOSIS — Z28.21 IMMUNIZATION NOT CARRIED OUT BECAUSE OF PATIENT REFUSAL: ICD-10-CM

## 2025-05-30 DIAGNOSIS — Z14.8 GENETIC CARRIER OF OTHER DISEASE: ICD-10-CM

## 2025-05-30 DIAGNOSIS — Z90.49 ACQUIRED ABSENCE OF OTHER SPECIFIED PARTS OF DIGESTIVE TRACT: Chronic | ICD-10-CM

## 2025-05-30 DIAGNOSIS — Z98.890 OTHER SPECIFIED POSTPROCEDURAL STATES: Chronic | ICD-10-CM

## 2025-05-30 DIAGNOSIS — Z3A.36 36 WEEKS GESTATION OF PREGNANCY: ICD-10-CM

## 2025-05-30 DIAGNOSIS — O60.03 PRETERM LABOR WITHOUT DELIVERY, THIRD TRIMESTER: ICD-10-CM

## 2025-05-30 DIAGNOSIS — Z71.3 DIETARY COUNSELING AND SURVEILLANCE: ICD-10-CM

## 2025-05-30 DIAGNOSIS — O24.410 GESTATIONAL DIABETES MELLITUS IN PREGNANCY, DIET CONTROLLED: ICD-10-CM

## 2025-05-30 LAB
APPEARANCE UR: ABNORMAL
BASOPHILS # BLD AUTO: 0.02 K/UL — SIGNIFICANT CHANGE UP (ref 0–0.2)
BASOPHILS NFR BLD AUTO: 0.2 % — SIGNIFICANT CHANGE UP (ref 0–1)
BILIRUB UR-MCNC: NEGATIVE — SIGNIFICANT CHANGE UP
COLOR SPEC: YELLOW — SIGNIFICANT CHANGE UP
DIFF PNL FLD: NEGATIVE — SIGNIFICANT CHANGE UP
EOSINOPHIL # BLD AUTO: 0.06 K/UL — SIGNIFICANT CHANGE UP (ref 0–0.7)
EOSINOPHIL NFR BLD AUTO: 0.7 % — SIGNIFICANT CHANGE UP (ref 0–8)
GLUCOSE BLDC GLUCOMTR-MCNC: 78 MG/DL — SIGNIFICANT CHANGE UP (ref 70–99)
GLUCOSE BLDC GLUCOMTR-MCNC: 83 MG/DL — SIGNIFICANT CHANGE UP (ref 70–99)
GLUCOSE BLDC GLUCOMTR-MCNC: 98 MG/DL — SIGNIFICANT CHANGE UP (ref 70–99)
GLUCOSE UR QL: 100 MG/DL
HCT VFR BLD CALC: 36.7 % — LOW (ref 37–47)
HGB BLD-MCNC: 12.5 G/DL — SIGNIFICANT CHANGE UP (ref 12–16)
HIV 1 & 2 AB SERPL IA.RAPID: SIGNIFICANT CHANGE UP
IMM GRANULOCYTES NFR BLD AUTO: 0.5 % — HIGH (ref 0.1–0.3)
KETONES UR QL: 40 MG/DL
L&D DRUG SCREEN, URINE: SIGNIFICANT CHANGE UP
LEUKOCYTE ESTERASE UR-ACNC: ABNORMAL
LYMPHOCYTES # BLD AUTO: 1.63 K/UL — SIGNIFICANT CHANGE UP (ref 1.2–3.4)
LYMPHOCYTES # BLD AUTO: 19.2 % — LOW (ref 20.5–51.1)
MCHC RBC-ENTMCNC: 31 PG — SIGNIFICANT CHANGE UP (ref 27–31)
MCHC RBC-ENTMCNC: 34.1 G/DL — SIGNIFICANT CHANGE UP (ref 32–37)
MCV RBC AUTO: 91.1 FL — SIGNIFICANT CHANGE UP (ref 81–99)
MONOCYTES # BLD AUTO: 0.61 K/UL — HIGH (ref 0.1–0.6)
MONOCYTES NFR BLD AUTO: 7.2 % — SIGNIFICANT CHANGE UP (ref 1.7–9.3)
NEUTROPHILS # BLD AUTO: 6.12 K/UL — SIGNIFICANT CHANGE UP (ref 1.4–6.5)
NEUTROPHILS NFR BLD AUTO: 72.2 % — SIGNIFICANT CHANGE UP (ref 42.2–75.2)
NITRITE UR-MCNC: NEGATIVE — SIGNIFICANT CHANGE UP
NRBC BLD AUTO-RTO: 0 /100 WBCS — SIGNIFICANT CHANGE UP (ref 0–0)
PH UR: 6.5 — SIGNIFICANT CHANGE UP (ref 5–8)
PLATELET # BLD AUTO: 310 K/UL — SIGNIFICANT CHANGE UP (ref 130–400)
PMV BLD: 9.9 FL — SIGNIFICANT CHANGE UP (ref 7.4–10.4)
PRENATAL SYPHILIS TEST: SIGNIFICANT CHANGE UP
PROT UR-MCNC: 30 MG/DL
RBC # BLD: 4.03 M/UL — LOW (ref 4.2–5.4)
RBC # FLD: 13.2 % — SIGNIFICANT CHANGE UP (ref 11.5–14.5)
SP GR SPEC: >1.03 — HIGH (ref 1–1.03)
UROBILINOGEN FLD QL: 1 MG/DL — SIGNIFICANT CHANGE UP (ref 0.2–1)
WBC # BLD: 8.48 K/UL — SIGNIFICANT CHANGE UP (ref 4.8–10.8)
WBC # FLD AUTO: 8.48 K/UL — SIGNIFICANT CHANGE UP (ref 4.8–10.8)

## 2025-05-30 PROCEDURE — 59025 FETAL NON-STRESS TEST: CPT

## 2025-05-30 PROCEDURE — 59510 CESAREAN DELIVERY: CPT

## 2025-05-30 PROCEDURE — 86850 RBC ANTIBODY SCREEN: CPT

## 2025-05-30 PROCEDURE — 86901 BLOOD TYPING SEROLOGIC RH(D): CPT

## 2025-05-30 PROCEDURE — 80354 DRUG SCREENING FENTANYL: CPT

## 2025-05-30 PROCEDURE — 86703 HIV-1/HIV-2 1 RESULT ANTBDY: CPT

## 2025-05-30 PROCEDURE — ZZZZZ: CPT

## 2025-05-30 PROCEDURE — 76818 FETAL BIOPHYS PROFILE W/NST: CPT

## 2025-05-30 PROCEDURE — 99213 OFFICE O/P EST LOW 20 MIN: CPT | Mod: 25

## 2025-05-30 PROCEDURE — 74018 RADEX ABDOMEN 1 VIEW: CPT | Mod: 26

## 2025-05-30 PROCEDURE — 86592 SYPHILIS TEST NON-TREP QUAL: CPT

## 2025-05-30 PROCEDURE — 80053 COMPREHEN METABOLIC PANEL: CPT

## 2025-05-30 PROCEDURE — 82962 GLUCOSE BLOOD TEST: CPT

## 2025-05-30 PROCEDURE — 36415 COLL VENOUS BLD VENIPUNCTURE: CPT

## 2025-05-30 PROCEDURE — 88307 TISSUE EXAM BY PATHOLOGIST: CPT

## 2025-05-30 PROCEDURE — 74018 RADEX ABDOMEN 1 VIEW: CPT

## 2025-05-30 PROCEDURE — 86900 BLOOD TYPING SEROLOGIC ABO: CPT

## 2025-05-30 PROCEDURE — 85025 COMPLETE CBC W/AUTO DIFF WBC: CPT

## 2025-05-30 PROCEDURE — 80307 DRUG TEST PRSMV CHEM ANLYZR: CPT

## 2025-05-30 PROCEDURE — 81001 URINALYSIS AUTO W/SCOPE: CPT

## 2025-05-30 RX ORDER — OXYTOCIN-SODIUM CHLORIDE 0.9% IV SOLN 30 UNIT/500ML 30-0.9/5 UT/ML-%
2 SOLUTION INTRAVENOUS
Qty: 30 | Refills: 0 | Status: DISCONTINUED | OUTPATIENT
Start: 2025-05-30 | End: 2025-05-30

## 2025-05-30 RX ORDER — DIPHENHYDRAMINE HCL 12.5MG/5ML
25 ELIXIR ORAL EVERY 6 HOURS
Refills: 0 | Status: COMPLETED | OUTPATIENT
Start: 2025-05-30 | End: 2026-04-28

## 2025-05-30 RX ORDER — PRENATAL 136/IRON/FOLIC ACID 27 MG-1 MG
1 TABLET ORAL DAILY
Refills: 0 | Status: DISCONTINUED | OUTPATIENT
Start: 2025-05-30 | End: 2025-06-01

## 2025-05-30 RX ORDER — SODIUM CHLORIDE 9 G/1000ML
1000 INJECTION, SOLUTION INTRAVENOUS
Refills: 0 | Status: DISCONTINUED | OUTPATIENT
Start: 2025-05-30 | End: 2025-05-30

## 2025-05-30 RX ORDER — SODIUM CHLORIDE 9 G/1000ML
1000 INJECTION, SOLUTION INTRAVENOUS
Refills: 0 | Status: DISCONTINUED | OUTPATIENT
Start: 2025-05-30 | End: 2025-06-01

## 2025-05-30 RX ORDER — KETOROLAC TROMETHAMINE 30 MG/ML
30 INJECTION, SOLUTION INTRAMUSCULAR; INTRAVENOUS EVERY 6 HOURS
Refills: 0 | Status: DISCONTINUED | OUTPATIENT
Start: 2025-05-30 | End: 2025-05-31

## 2025-05-30 RX ORDER — ACETAMINOPHEN 500 MG/5ML
1000 LIQUID (ML) ORAL ONCE
Refills: 0 | Status: COMPLETED | OUTPATIENT
Start: 2025-05-30 | End: 2025-05-30

## 2025-05-30 RX ORDER — ENOXAPARIN SODIUM 100 MG/ML
40 INJECTION SUBCUTANEOUS EVERY 24 HOURS
Refills: 0 | Status: DISCONTINUED | OUTPATIENT
Start: 2025-05-31 | End: 2025-06-01

## 2025-05-30 RX ORDER — OXYTOCIN-SODIUM CHLORIDE 0.9% IV SOLN 30 UNIT/500ML 30-0.9/5 UT/ML-%
42 SOLUTION INTRAVENOUS
Qty: 30 | Refills: 0 | Status: DISCONTINUED | OUTPATIENT
Start: 2025-05-30 | End: 2025-06-01

## 2025-05-30 RX ORDER — IBUPROFEN 200 MG
600 TABLET ORAL EVERY 6 HOURS
Refills: 0 | Status: COMPLETED | OUTPATIENT
Start: 2025-05-30 | End: 2026-04-28

## 2025-05-30 RX ORDER — ACETAMINOPHEN 500 MG/5ML
975 LIQUID (ML) ORAL
Refills: 0 | Status: DISCONTINUED | OUTPATIENT
Start: 2025-05-30 | End: 2025-06-01

## 2025-05-30 RX ORDER — MAGNESIUM HYDROXIDE 400 MG/5ML
30 SUSPENSION ORAL
Refills: 0 | Status: DISCONTINUED | OUTPATIENT
Start: 2025-05-30 | End: 2025-06-01

## 2025-05-30 RX ORDER — MODIFIED LANOLIN 100 %
1 CREAM (GRAM) TOPICAL EVERY 6 HOURS
Refills: 0 | Status: DISCONTINUED | OUTPATIENT
Start: 2025-05-30 | End: 2025-06-01

## 2025-05-30 RX ORDER — CEFAZOLIN SODIUM IN 0.9 % NACL 3 G/100 ML
2000 INTRAVENOUS SOLUTION, PIGGYBACK (ML) INTRAVENOUS EVERY 8 HOURS
Refills: 0 | Status: COMPLETED | OUTPATIENT
Start: 2025-05-31 | End: 2025-05-31

## 2025-05-30 RX ORDER — OXYCODONE HYDROCHLORIDE 30 MG/1
5 TABLET ORAL
Refills: 0 | Status: COMPLETED | OUTPATIENT
Start: 2025-05-30 | End: 2025-06-06

## 2025-05-30 RX ORDER — SIMETHICONE 80 MG
80 TABLET,CHEWABLE ORAL EVERY 4 HOURS
Refills: 0 | Status: DISCONTINUED | OUTPATIENT
Start: 2025-05-30 | End: 2025-06-01

## 2025-05-30 RX ORDER — OXYTOCIN-SODIUM CHLORIDE 0.9% IV SOLN 30 UNIT/500ML 30-0.9/5 UT/ML-%
167 SOLUTION INTRAVENOUS
Qty: 30 | Refills: 0 | Status: COMPLETED | OUTPATIENT
Start: 2025-05-30 | End: 2025-05-30

## 2025-05-30 RX ORDER — OXYCODONE HYDROCHLORIDE 30 MG/1
5 TABLET ORAL ONCE
Refills: 0 | Status: DISCONTINUED | OUTPATIENT
Start: 2025-05-30 | End: 2025-06-01

## 2025-05-30 RX ADMIN — Medication 100 MILLIGRAM(S): at 22:09

## 2025-05-30 RX ADMIN — Medication 400 MILLIGRAM(S): at 23:55

## 2025-05-30 RX ADMIN — Medication 250 MILLIGRAM(S): at 22:20

## 2025-05-30 RX ADMIN — OXYTOCIN-SODIUM CHLORIDE 0.9% IV SOLN 30 UNIT/500ML 2 MILLIUNIT(S)/MIN: 30-0.9/5 SOLUTION at 17:26

## 2025-05-30 NOTE — OB PROVIDER H&P - NSHPLABSRESULTS_GEN_ALL_CORE
11/23    MMRV immune   HIV NR   Hep B NR   HCV NR   B pos   antibody screen neg  RPR NR    2/24      3/24  GTT 84/187/172/160    5/19  GBS neg  RPR neg  UPrCR 0.2    sonos:   32w5d- vertex, anterior placenta, MVP 5.66cm, BPP 10/10, EFW 2185g (62%tile)   22w5d- breech, anterior placenta, VIRY wnl, limited views of fetal heart --> echo wnl   20w6d- vertex, anterior placenta, MVP 4.72cm, EFW 391g, incomplete visualization, no major malformations   12w5d- NT wnl

## 2025-05-30 NOTE — CHART NOTE - NSCHARTNOTEFT_GEN_A_CORE
PGY 3 Note    **DELAYED ENTRY    Pt seen at bedside for 5 minute deceleration. Pt repositioned, pitocin turned off, IVF bolus given. SVE 10/100/-1. Tracing recovered. PMD notified.     PMD then present, began pushing with patient, FHR deceleration noted that became prolonged with lack of reading from external EFM. BSS brought to the room which confirmed FHR in the 40s, FSE also placed which could not  HR.   Decision made to proceed to OR for possible STAT C/S. Verbal consent obtained from the patient. Anesthesia called and team mobilized. Pt moved emergently to OR. FHR in OR rechecked with sonogram and noted to be in the 70s-80s bpm, still not recovered. Decision made to proceed with STAT c/s. PGY 3 Note    **DELAYED ENTRY    Pt seen at bedside for 5 minute deceleration. Pt repositioned, pitocin turned off, IVF bolus given. SVE 10/100/-1. Tracing recovered. PMD notified.     PMD then present, began pushing with patient, FHR deceleration noted that became prolonged with lack of reading from external EFM. BSS brought to the room which confirmed FHR in the 40s, FSE also placed which could not  HR.   Decision made to proceed to OR for possible STAT C/S. Verbal consent obtained from the patient. Anesthesia called and team mobilized. Pt moved emergently to OR. FHR in OR rechecked with sonogram and noted to be in the 70s bpm, still not recovered. Decision made to proceed with STAT c/s.      Attending late entry note due to emergency:    I went to patient bedside and to examine for pushing due to feeling rectal pressure and FD.  Patient was placed in Heriberto position and asked to push with contraction. She pushed 3 times but the FHR was noted to have decelerations on the external monitor, but then was not able to be heard or seen on tracing.  Patient was lateralized and given O2 and sonogram was obtained.  While this was happening I placed and FSE to try to monitor the FHR and it was not able to read and the beats that were heard appeared to be very low in the 30-40s with no consistency.  Due to no evidence of recovery and confirmed nguyen cardia on sonogram, the patient was verbally counseled on need for potential emergent  section and she was moved to the OR quickly.  Patient verbally agreed with  witness and in agreement, but a written consent was not obtained prior to surgery.  When we reached the OR the FHR was still noted to be bradycardic in the 70s with no evidence of recovery. The decision was made to proceed with emergency  section.    The  section was performed without complication.  She had suboptimal pain mgmt even with duramorph and anesthesia mgmt.  Bedside sonogram showed no free fluid after case or bowel distention.  All layers were closed and interceed was placed for adhesion prevention. Post surgery abd xray showed cholecystectomy clips but no evidence of acute pathology or retained laps/instruments.  Patient was taken to the recovery in stable condition - see separate operative note and delivery summary- AF

## 2025-05-30 NOTE — OB PROVIDER H&P - ATTENDING COMMENTS
33yo  @ 38 wks came to labor and delivery for direct admission for IOL. She denies VB, LOF , CTXs and has good FM.  She has timbo going to NST/BPP 2-3x weekly after coming for evaluation with decreased FM and BPP 4/8 and uncontrolled GDMA1.  She was diagnosed with GDMA2 and started on insulin NPH at night that was recently increased do to elevated fasting glucose.  She was noted to have increased irregular placenta lakes on sonogram today with amniotic membrane instability noted on sonogram.  MFM recommended delivery.  She has known polyhydramnios.  GBS negative.     SVE: 4/80/-2    FHT: 140s, moderate variability, + accelerations, no decelerations, CTXs irregular    Patient counseled on recommendation of pitocin augmentation and will AROM is SROM does not occur during induction process, and she states she would like epidural pain mgmt once CTXs are appreciated.     A: 33yo  @ 38 wks for IOL, moderate control GDMA2, polyhydramnios, premature cervical dilation, obesity    P: admit      admission labs        IV hydration       FS with ISS       continuous fetal monitoring       pitocin augmentation as tolerate PRN      epidural pain mgmt PRN       venodyne compression       will reassess as clinically indicated

## 2025-05-30 NOTE — OB PROVIDER H&P - ASSESSMENT
33yo  at 38w0d, GBS neg, GDM on insulin, IOL.    #GDMA1  -FS q4h in latent, q2h in active labor  -6u insulin QHS    #IOL  -Admit to L+D  -Monitor EFM and TOCO   -IVF and labs  -Pain control PRN  -Clear liquid diet as tolerated  -Monitor vitals

## 2025-05-30 NOTE — PROCEDURE NOTE - SUPERVISORY STATEMENT
Department of Emergency Medicine  FIRST PROVIDER TRIAGE NOTE             Independent MLP           1/14/23  12:38 AM EST    Date of Encounter: 1/14/23   MRN: [id number]      HPI: Jahaira Burrows is a 37 y.o. female who presents to the ED for Abdominal Pain (Lower abd, urinary freq) and Back Pain     Lower abdominal pain that radiates into her back. She is been having urinary frequency. Denies any vaginal bleeding or discharge. ROS: Negative for cp or sob. PE: Gen Appearance/Constitutional: alert  Musculoskeletal: moves all extremities x 4     Initial Plan of Care: All treatment areas with department are currently occupied. Plan to order/Initiate the following while awaiting opening in ED: labs.   Initiate Treatment-Testing, Proceed toTreatment Area When Bed Available for ED Attending/MLP to Continue Care    Electronically signed by MARILYN Alves CNP   DD: 1/14/23       MARILYN Alves CNP  01/14/23 0038
taken to OR at 22:09, tolerated procedure well.

## 2025-05-30 NOTE — OB PROVIDER H&P - NSHPPHYSICALEXAM_GEN_ALL_CORE
T(C): --  HR: --  BP: --  RR: --  SpO2: --  BMI (kg/m2): 36 (05-19-25 @ 16:57)    Gen: A+OX3. NAD  Abd: Soft, Nontender. Gravid.  SVE:    FHR: 140/mod/+accels  TOCO: irr      EFW by Leopolds: HR: 89 (05-30-25 @ 16:16) (89 - 89)  BP: 116/68 (05-30-25 @ 16:16) (116/68 - 116/68)  BMI (kg/m2): 36 (05-19-25 @ 16:57)    Gen: A+OX3. NAD  Abd: Soft, Nontender. Gravid.  SVE:  SVE: 4/80/-2  FHR: 140/mod/+accels  TOCO: irr

## 2025-05-30 NOTE — OB PROVIDER DELIVERY SUMMARY - NSSELHIDDEN_OBGYN_ALL_OB_FT
[NS_[email or web address]_ALL_OB_FT:[id number]],[NS_DeliveryAssist1_OBGYN_ALL_OB_FT:[id number]],[NS_DeliveryRN_OBGYN_ALL_OB_FT:[id number]=]

## 2025-05-30 NOTE — OB PROVIDER H&P - HISTORY OF PRESENT ILLNESS
33yo  at 38w0d, KIERAN 25 by LMP c/w early sono, presenting to L&D for IOL. Patient presented to antepartum testing today, was noted to have larger placental lakes with more irregular shapes than before. M recommended IOL due to ultrasound findings and GDM controlled on insulin. Of note, patient was admitted to L&D overnight at 36w4d for r/o  labor, cervix arrested at 4cm at that time. Patient denies contractions at this time. Patient denies vaginal bleeding, leakage of fluid. Endorses good fetal movements. GBS neg.     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 Patient presented to antepartum testing today, was noted to have larger placental lakes with more irregular shapes than before. M recommended IOL due to ultrasound findings and GDM controlled on insulin. Of note, patient was admitted to L&D overnight at 36w4d for r/o  labor, cervix arrested at 4cm at that time. Patient denies contractions at this time. Patient denies vaginal bleeding, leakage of fluid. Endorses good fetal movements. GBS neg.     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

## 2025-05-30 NOTE — BRIEF OPERATIVE NOTE - OPERATION/FINDINGS
Pfannenstiel skin incision, low transverse uterine incision. Clear amniotic fluid.  in cephalic position, delivered. APGARs 9/9. 3490g. Hysterotomy closed in two layers. Normal tubes and ovaries bilaterally. Interseed placed over anterior uterine wall.  Pfannenstiel skin incision, low transverse uterine incision. Clear amniotic fluid.  in cephalic position, delivered. APGARs 9/9. 3490g. Hysterotomy closed in two layers. All layers closed.  Normal tubes and ovaries bilaterally. Interceed placed over anterior uterine wall for adhesion prevention.

## 2025-05-30 NOTE — OB RN INTRAOPERATIVE NOTE - NSSELHIDDEN_OBGYN_ALL_OB_FT
[NS_DeliveryAttending1_OBGYN_ALL_OB_FT:[id number]],[NS_DeliveryAssist1_OBGYN_ALL_OB_FT:[id number]],[NS_DeliveryRN_OBGYN_ALL_OB_FT:[id number]]

## 2025-05-30 NOTE — OB RN DELIVERY SUMMARY - NSSELHIDDEN_OBGYN_ALL_OB_FT
[NS_DeliveryAttending1_OBGYN_ALL_OB_FT:[id number]],[NS_DeliveryAssist1_OBGYN_ALL_OB_FT:[id number]],[NS_DeliveryRN_OBGYN_ALL_OB_FT:[id number]=]

## 2025-05-30 NOTE — OB PROVIDER H&P - CURRENT PREGNANCY COMPLICATIONS, OB PROFILE
Gestational Diabetes  CTXs, placenta lakes and amniotic membrane instability/Gestational Diabetes/Polyhydramnios

## 2025-05-31 ENCOUNTER — RESULT REVIEW (OUTPATIENT)
Age: 35
End: 2025-05-31

## 2025-05-31 LAB
ALBUMIN SERPL ELPH-MCNC: 2.8 G/DL — LOW (ref 3.5–5.2)
ALP SERPL-CCNC: 125 U/L — HIGH (ref 30–115)
ALT FLD-CCNC: 15 U/L — SIGNIFICANT CHANGE UP (ref 0–41)
ANION GAP SERPL CALC-SCNC: 13 MMOL/L — SIGNIFICANT CHANGE UP (ref 7–14)
AST SERPL-CCNC: 23 U/L — SIGNIFICANT CHANGE UP (ref 0–41)
BASOPHILS # BLD AUTO: 0.02 K/UL — SIGNIFICANT CHANGE UP (ref 0–0.2)
BASOPHILS NFR BLD AUTO: 0.2 % — SIGNIFICANT CHANGE UP (ref 0–1)
BILIRUB SERPL-MCNC: 0.6 MG/DL — SIGNIFICANT CHANGE UP (ref 0.2–1.2)
BUN SERPL-MCNC: 9 MG/DL — LOW (ref 10–20)
CALCIUM SERPL-MCNC: 9.2 MG/DL — SIGNIFICANT CHANGE UP (ref 8.4–10.5)
CHLORIDE SERPL-SCNC: 105 MMOL/L — SIGNIFICANT CHANGE UP (ref 98–110)
CO2 SERPL-SCNC: 17 MMOL/L — SIGNIFICANT CHANGE UP (ref 17–32)
CREAT SERPL-MCNC: <0.5 MG/DL — LOW (ref 0.7–1.5)
EGFR: 135 ML/MIN/1.73M2 — SIGNIFICANT CHANGE UP
EGFR: 135 ML/MIN/1.73M2 — SIGNIFICANT CHANGE UP
EOSINOPHIL # BLD AUTO: 0.02 K/UL — SIGNIFICANT CHANGE UP (ref 0–0.7)
EOSINOPHIL NFR BLD AUTO: 0.2 % — SIGNIFICANT CHANGE UP (ref 0–8)
GLUCOSE SERPL-MCNC: 114 MG/DL — HIGH (ref 70–99)
HCT VFR BLD CALC: 28.3 % — LOW (ref 37–47)
HGB BLD-MCNC: 9.7 G/DL — LOW (ref 12–16)
IMM GRANULOCYTES NFR BLD AUTO: 0.3 % — SIGNIFICANT CHANGE UP (ref 0.1–0.3)
LYMPHOCYTES # BLD AUTO: 1.15 K/UL — LOW (ref 1.2–3.4)
LYMPHOCYTES # BLD AUTO: 11.4 % — LOW (ref 20.5–51.1)
MCHC RBC-ENTMCNC: 31.1 PG — HIGH (ref 27–31)
MCHC RBC-ENTMCNC: 34.3 G/DL — SIGNIFICANT CHANGE UP (ref 32–37)
MCV RBC AUTO: 90.7 FL — SIGNIFICANT CHANGE UP (ref 81–99)
MONOCYTES # BLD AUTO: 0.41 K/UL — SIGNIFICANT CHANGE UP (ref 0.1–0.6)
MONOCYTES NFR BLD AUTO: 4.1 % — SIGNIFICANT CHANGE UP (ref 1.7–9.3)
NEUTROPHILS # BLD AUTO: 8.48 K/UL — HIGH (ref 1.4–6.5)
NEUTROPHILS NFR BLD AUTO: 83.8 % — HIGH (ref 42.2–75.2)
NRBC BLD AUTO-RTO: 0 /100 WBCS — SIGNIFICANT CHANGE UP (ref 0–0)
PLATELET # BLD AUTO: 240 K/UL — SIGNIFICANT CHANGE UP (ref 130–400)
PMV BLD: 10 FL — SIGNIFICANT CHANGE UP (ref 7.4–10.4)
POTASSIUM SERPL-MCNC: 4.2 MMOL/L — SIGNIFICANT CHANGE UP (ref 3.5–5)
POTASSIUM SERPL-SCNC: 4.2 MMOL/L — SIGNIFICANT CHANGE UP (ref 3.5–5)
PROT SERPL-MCNC: 4.6 G/DL — LOW (ref 6–8)
RBC # BLD: 3.12 M/UL — LOW (ref 4.2–5.4)
RBC # FLD: 13 % — SIGNIFICANT CHANGE UP (ref 11.5–14.5)
SODIUM SERPL-SCNC: 135 MMOL/L — SIGNIFICANT CHANGE UP (ref 135–146)
WBC # BLD: 10.11 K/UL — SIGNIFICANT CHANGE UP (ref 4.8–10.8)
WBC # FLD AUTO: 10.11 K/UL — SIGNIFICANT CHANGE UP (ref 4.8–10.8)

## 2025-05-31 PROCEDURE — 88307 TISSUE EXAM BY PATHOLOGIST: CPT | Mod: 26

## 2025-05-31 RX ORDER — AZITHROMYCIN 250 MG
500 CAPSULE ORAL ONCE
Refills: 0 | Status: COMPLETED | OUTPATIENT
Start: 2025-05-31 | End: 2025-05-30

## 2025-05-31 RX ORDER — DIPHENHYDRAMINE HCL 12.5MG/5ML
25 ELIXIR ORAL EVERY 6 HOURS
Refills: 0 | Status: DISCONTINUED | OUTPATIENT
Start: 2025-05-31 | End: 2025-06-01

## 2025-05-31 RX ORDER — CEFAZOLIN SODIUM IN 0.9 % NACL 3 G/100 ML
2000 INTRAVENOUS SOLUTION, PIGGYBACK (ML) INTRAVENOUS ONCE
Refills: 0 | Status: COMPLETED | OUTPATIENT
Start: 2025-05-31 | End: 2025-05-30

## 2025-05-31 RX ORDER — SODIUM CHLORIDE 9 G/1000ML
500 INJECTION, SOLUTION INTRAVENOUS ONCE
Refills: 0 | Status: COMPLETED | OUTPATIENT
Start: 2025-05-31 | End: 2025-05-31

## 2025-05-31 RX ORDER — IRON SUCROSE 20 MG/ML
200 INJECTION, SOLUTION INTRAVENOUS ONCE
Refills: 0 | Status: COMPLETED | OUTPATIENT
Start: 2025-05-31 | End: 2025-05-31

## 2025-05-31 RX ORDER — HYDROMORPHONE/SOD CHLOR,ISO/PF 2 MG/10 ML
1 SYRINGE (ML) INJECTION
Refills: 0 | Status: DISCONTINUED | OUTPATIENT
Start: 2025-05-31 | End: 2025-05-31

## 2025-05-31 RX ADMIN — Medication 1 MILLIGRAM(S): at 00:39

## 2025-05-31 RX ADMIN — Medication 1 MILLIGRAM(S): at 03:30

## 2025-05-31 RX ADMIN — IRON SUCROSE 100 MILLIGRAM(S): 20 INJECTION, SOLUTION INTRAVENOUS at 13:46

## 2025-05-31 RX ADMIN — Medication 1 MILLIGRAM(S): at 01:00

## 2025-05-31 RX ADMIN — Medication 1 TABLET(S): at 11:17

## 2025-05-31 RX ADMIN — KETOROLAC TROMETHAMINE 30 MILLIGRAM(S): 30 INJECTION, SOLUTION INTRAMUSCULAR; INTRAVENOUS at 07:11

## 2025-05-31 RX ADMIN — Medication 975 MILLIGRAM(S): at 14:57

## 2025-05-31 RX ADMIN — KETOROLAC TROMETHAMINE 30 MILLIGRAM(S): 30 INJECTION, SOLUTION INTRAMUSCULAR; INTRAVENOUS at 17:44

## 2025-05-31 RX ADMIN — KETOROLAC TROMETHAMINE 30 MILLIGRAM(S): 30 INJECTION, SOLUTION INTRAMUSCULAR; INTRAVENOUS at 11:47

## 2025-05-31 RX ADMIN — Medication 975 MILLIGRAM(S): at 15:27

## 2025-05-31 RX ADMIN — Medication 1000 MILLIGRAM(S): at 00:56

## 2025-05-31 RX ADMIN — OXYTOCIN-SODIUM CHLORIDE 0.9% IV SOLN 30 UNIT/500ML 167 MILLIUNIT(S)/MIN: 30-0.9/5 SOLUTION at 00:09

## 2025-05-31 RX ADMIN — Medication 80 MILLIGRAM(S): at 06:41

## 2025-05-31 RX ADMIN — Medication 80 MILLIGRAM(S): at 09:12

## 2025-05-31 RX ADMIN — Medication 80 MILLIGRAM(S): at 23:23

## 2025-05-31 RX ADMIN — Medication 975 MILLIGRAM(S): at 09:11

## 2025-05-31 RX ADMIN — KETOROLAC TROMETHAMINE 30 MILLIGRAM(S): 30 INJECTION, SOLUTION INTRAMUSCULAR; INTRAVENOUS at 11:17

## 2025-05-31 RX ADMIN — Medication 1 MILLIGRAM(S): at 02:50

## 2025-05-31 RX ADMIN — SODIUM CHLORIDE 100 MILLILITER(S): 9 INJECTION, SOLUTION INTRAVENOUS at 00:10

## 2025-05-31 RX ADMIN — Medication 80 MILLIGRAM(S): at 17:37

## 2025-05-31 RX ADMIN — KETOROLAC TROMETHAMINE 30 MILLIGRAM(S): 30 INJECTION, SOLUTION INTRAMUSCULAR; INTRAVENOUS at 17:37

## 2025-05-31 RX ADMIN — Medication 25 MILLIGRAM(S): at 11:44

## 2025-05-31 RX ADMIN — Medication 80 MILLIGRAM(S): at 13:46

## 2025-05-31 RX ADMIN — Medication 100 MILLIGRAM(S): at 06:41

## 2025-05-31 RX ADMIN — OXYTOCIN-SODIUM CHLORIDE 0.9% IV SOLN 30 UNIT/500ML 42 MILLIUNIT(S)/MIN: 30-0.9/5 SOLUTION at 00:59

## 2025-05-31 RX ADMIN — Medication 975 MILLIGRAM(S): at 09:41

## 2025-05-31 RX ADMIN — KETOROLAC TROMETHAMINE 30 MILLIGRAM(S): 30 INJECTION, SOLUTION INTRAMUSCULAR; INTRAVENOUS at 06:41

## 2025-05-31 RX ADMIN — SODIUM CHLORIDE 1000 MILLILITER(S): 9 INJECTION, SOLUTION INTRAVENOUS at 00:21

## 2025-05-31 RX ADMIN — Medication 100 MILLIGRAM(S): at 14:57

## 2025-06-01 VITALS
DIASTOLIC BLOOD PRESSURE: 70 MMHG | RESPIRATION RATE: 18 BRPM | OXYGEN SATURATION: 100 % | TEMPERATURE: 98 F | HEART RATE: 95 BPM | SYSTOLIC BLOOD PRESSURE: 109 MMHG

## 2025-06-01 LAB
BASOPHILS # BLD AUTO: 0.02 K/UL — SIGNIFICANT CHANGE UP (ref 0–0.2)
BASOPHILS NFR BLD AUTO: 0.2 % — SIGNIFICANT CHANGE UP (ref 0–1)
EOSINOPHIL # BLD AUTO: 0.1 K/UL — SIGNIFICANT CHANGE UP (ref 0–0.7)
EOSINOPHIL NFR BLD AUTO: 0.9 % — SIGNIFICANT CHANGE UP (ref 0–8)
HCT VFR BLD CALC: 29.6 % — LOW (ref 37–47)
HGB BLD-MCNC: 9.8 G/DL — LOW (ref 12–16)
IMM GRANULOCYTES NFR BLD AUTO: 0.4 % — HIGH (ref 0.1–0.3)
LYMPHOCYTES # BLD AUTO: 1.78 K/UL — SIGNIFICANT CHANGE UP (ref 1.2–3.4)
LYMPHOCYTES # BLD AUTO: 16.4 % — LOW (ref 20.5–51.1)
MCHC RBC-ENTMCNC: 30.5 PG — SIGNIFICANT CHANGE UP (ref 27–31)
MCHC RBC-ENTMCNC: 33.1 G/DL — SIGNIFICANT CHANGE UP (ref 32–37)
MCV RBC AUTO: 92.2 FL — SIGNIFICANT CHANGE UP (ref 81–99)
MONOCYTES # BLD AUTO: 0.58 K/UL — SIGNIFICANT CHANGE UP (ref 0.1–0.6)
MONOCYTES NFR BLD AUTO: 5.3 % — SIGNIFICANT CHANGE UP (ref 1.7–9.3)
NEUTROPHILS # BLD AUTO: 8.36 K/UL — HIGH (ref 1.4–6.5)
NEUTROPHILS NFR BLD AUTO: 76.8 % — HIGH (ref 42.2–75.2)
NRBC BLD AUTO-RTO: 0 /100 WBCS — SIGNIFICANT CHANGE UP (ref 0–0)
PLATELET # BLD AUTO: 253 K/UL — SIGNIFICANT CHANGE UP (ref 130–400)
PMV BLD: 9.7 FL — SIGNIFICANT CHANGE UP (ref 7.4–10.4)
RBC # BLD: 3.21 M/UL — LOW (ref 4.2–5.4)
RBC # FLD: 13.3 % — SIGNIFICANT CHANGE UP (ref 11.5–14.5)
WBC # BLD: 10.88 K/UL — HIGH (ref 4.8–10.8)
WBC # FLD AUTO: 10.88 K/UL — HIGH (ref 4.8–10.8)

## 2025-06-01 RX ORDER — OXYCODONE HYDROCHLORIDE 30 MG/1
5 TABLET ORAL
Refills: 0 | Status: DISCONTINUED | OUTPATIENT
Start: 2025-06-01 | End: 2025-06-01

## 2025-06-01 RX ORDER — IBUPROFEN 200 MG
600 TABLET ORAL EVERY 6 HOURS
Refills: 0 | Status: DISCONTINUED | OUTPATIENT
Start: 2025-06-01 | End: 2025-06-01

## 2025-06-01 RX ORDER — OXYCODONE HYDROCHLORIDE 30 MG/1
1 TABLET ORAL
Qty: 10 | Refills: 0
Start: 2025-06-01

## 2025-06-01 RX ORDER — IBUPROFEN 200 MG
1 TABLET ORAL
Qty: 30 | Refills: 0
Start: 2025-06-01 | End: 2025-06-07

## 2025-06-01 RX ORDER — SIMETHICONE 80 MG
1 TABLET,CHEWABLE ORAL
Qty: 0 | Refills: 0 | DISCHARGE
Start: 2025-06-01

## 2025-06-01 RX ORDER — ACETAMINOPHEN 500 MG/5ML
3 LIQUID (ML) ORAL
Qty: 84 | Refills: 0
Start: 2025-06-01 | End: 2025-06-07

## 2025-06-01 RX ORDER — PRENATAL 136/IRON/FOLIC ACID 27 MG-1 MG
1 TABLET ORAL
Qty: 0 | Refills: 0 | DISCHARGE
Start: 2025-06-01

## 2025-06-01 RX ADMIN — Medication 975 MILLIGRAM(S): at 03:07

## 2025-06-01 RX ADMIN — Medication 1 TABLET(S): at 12:23

## 2025-06-01 RX ADMIN — Medication 600 MILLIGRAM(S): at 06:25

## 2025-06-01 RX ADMIN — Medication 975 MILLIGRAM(S): at 08:18

## 2025-06-01 RX ADMIN — OXYCODONE HYDROCHLORIDE 5 MILLIGRAM(S): 30 TABLET ORAL at 03:47

## 2025-06-01 RX ADMIN — Medication 80 MILLIGRAM(S): at 03:24

## 2025-06-01 RX ADMIN — Medication 600 MILLIGRAM(S): at 05:55

## 2025-06-01 RX ADMIN — Medication 600 MILLIGRAM(S): at 12:22

## 2025-06-01 RX ADMIN — Medication 80 MILLIGRAM(S): at 05:55

## 2025-06-01 RX ADMIN — Medication 100 MILLIGRAM(S): at 00:47

## 2025-06-01 NOTE — DISCHARGE NOTE OB - HOSPITAL COURSE
35yo now P1 S/P LTCS STAT for fetal bradycardia EBL; 600cc s/p ancef [id number]. Clinically and hemodynamically stable. Cleared for discharge                           9.8    10.88 )-----------( 253      ( 01 Jun 2025 07:31 )             29.6

## 2025-06-01 NOTE — DISCHARGE NOTE OB - MEDICATION SUMMARY - MEDICATIONS TO TAKE
I will START or STAY ON the medications listed below when I get home from the hospital:    ibuprofen 600 mg oral tablet  -- 1 tab(s) by mouth every 6 hours  -- Indication: For pain    acetaminophen 325 mg oral tablet  -- 3 tab(s) by mouth every 6 hours  -- Indication: For pain    oxyCODONE 5 mg oral tablet  -- 1 tab(s) by mouth every 3 hours as needed for Moderate to Severe Pain (4-10) MDD: 4  -- Indication: For pain    Prenatal Multivitamins with Folic Acid 1 mg oral tablet  -- 1 tab(s) by mouth once a day  -- Indication: For healthy mother     simethicone 80 mg oral tablet, chewable  -- 1 tab(s) by mouth every 4 hours  -- Indication: For gas pain

## 2025-06-01 NOTE — DISCHARGE NOTE OB - CARE PLAN
1 Principal Discharge DX:	 delivery delivered  Assessment and plan of treatment:	PAIN MANAGEMENT:   Alternate Acetaminophen/Tylenol and Ibuprofen/Motrin (if you are eligible). Each of these medications can be taken every six hours. Try to stagger them so that you are taking something for pain every three hours (ex. Take Motrin at 12:00, Tylenol at 3:00, Motrin at 6:00, etc.) to maximize pain relief.  o Dosages       - Tylenol – 500-650 mg every 6 hours as needed       - Motrin/Ibuprofen - 600 mg every 6 hours as needed  o The maximum dose of Tylenol is 3000 mg in 24 hours, the maximum dose of Motrin/ibuprofen is 2400mg in 24 hours  A warm shower or heating pad may also help.    WOUND CARE  Keep incisions clean and dry. No heavy lifting x4 weeks. Nothing in the vagina for 6 weeks - no sex, tampons, douching, tub baths or pools. May Shower. If you have a fever over 100.4F, severe pain or severe bleeding, please call your doctor or visit the emergency room.     FOLLOW UP in 1 week for incision check and 6 weeks for postpartum check with your doctor.

## 2025-06-01 NOTE — DISCHARGE NOTE OB - PATIENT PORTAL LINK FT
You can access the FollowMyHealth Patient Portal offered by [facility] by registering at the following website: [email or web address]. By joining [facility]’s FollowMyHealth portal, you will also be able to view your health information using other applications (apps) compatible with our system.

## 2025-06-01 NOTE — PROGRESS NOTE ADULT - SUBJECTIVE AND OBJECTIVE BOX
SEBASTIAN GUALLPA  34y  Female    PGY2 Note:  Patient seen and examined bedside. Pain well controlled on PO pain meds. Denies heavy vaginal bleeding. Not yet ambulating. Tolerating diet, Bravo in place draining clear urine. Not yet passing flatus. Denies fevers/chills, chest pain, SOB, severe abdominal pain, or heavy vaginal bleeding.     Physical Exam  Vital Signs Last 24 Hrs  T(C): 36.7 (05-31-25 @ 03:32), Max: 36.9 (05-30-25 @ 18:04)  T(F): 98 (05-31-25 @ 03:32), Max: 98.4 (05-30-25 @ 18:04)  HR: 74 (05-31-25 @ 03:32) (69 - 102)  BP: 98/64 (05-31-25 @ 03:32) (92/50 - 131/69)  RR: 18 (05-31-25 @ 03:32) (18 - 18)  SpO2: 98% (05-31-25 @ 03:32) (85% - 100%)      Gen: NAD, sitting comfortably  Ext: No calf tenderness, no swelling.   Abd: mildly distended, soft, mildly tender, BS+, fundus firm, and below umbilicus.   Lochia: Minimal rubra  Wound: Pfannenstiel skin incision clean, dry intact. Dermabond. No surrounding edema or erythema.        PAST MEDICAL & SURGICAL HISTORY:  No pertinent past medical history  History of cholecystectomy  S/P breast lumpectomy      Diet: regular    Labs:                        12.5   8.48  )-----------( 310      ( 30 May 2025 16:40 )             36.7          acetaminophen     Tablet .. 975 milliGRAM(s) Oral <User Schedule>  ceFAZolin   IVPB 2000 milliGRAM(s) IV Intermittent every 8 hours  diphenhydrAMINE 25 milliGRAM(s) Oral every 6 hours PRN  diphtheria/tetanus/pertussis (acellular) Vaccine (Adacel) 0.5 milliLiter(s) IntraMuscular once  enoxaparin Injectable 40 milliGRAM(s) SubCutaneous every 24 hours  HYDROmorphone  Injectable 1 milliGRAM(s) IV Push every 15 minutes PRN  ibuprofen  Tablet. 600 milliGRAM(s) Oral every 6 hours  ketorolac   Injectable 30 milliGRAM(s) IV Push every 6 hours  lactated ringers Bolus 500 milliLiter(s) IV Bolus once  lactated ringers. 1000 milliLiter(s) IV Continuous <Continuous>  lactated ringers. 1000 milliLiter(s) IV Continuous <Continuous>  lanolin Ointment 1 Application(s) Topical every 6 hours PRN  magnesium hydroxide Suspension 30 milliLiter(s) Oral two times a day PRN  oxyCODONE    IR 5 milliGRAM(s) Oral every 3 hours PRN  oxyCODONE    IR 5 milliGRAM(s) Oral once PRN  oxytocin Infusion 42 milliUNIT(s)/Min IV Continuous <Continuous>  prenatal multivitamin 1 Tablet(s) Oral daily  simethicone 80 milliGRAM(s) Chew every 4 hours      
 SEBASTIAN SALONI  34y  Female    PGY1 Note:  Patient seen and examined bedside. No overnight events. Denies heavy vaginal bleeding. Pain well controlled with PO oxycodone. Ambulating without difficulty. Tolerating diet, voiding, passing flatus, and BM. Breastfeeding.     Physical Exam  Vital Signs Last 24 Hrs  T(C): 37.1 (01 Jun 2025 07:04), Max: 37.1 (01 Jun 2025 07:04)  T(F): 98.7 (01 Jun 2025 07:04), Max: 98.7 (01 Jun 2025 07:04)  HR: 80 (01 Jun 2025 07:04) (80 - 100)  BP: 99/63 (01 Jun 2025 07:04) (94/63 - 120/78)  RR: 18 (01 Jun 2025 07:04) (18 - 18)  SpO2: 100% (01 Jun 2025 07:04) (96% - 100%)    Gen: NAD, sitting comfortably  Ext: No calf tenderness, no swelling.   Abd: Nondistended, soft, nontender, fundus firm, and below umbilicus.   Lochia: Minimal rubra  Wound: Pfannenstiel skin incision clean, dry intact. No surrounding edema or erythema.        PAST MEDICAL & SURGICAL HISTORY:  No pertinent past medical history  History of cholecystectomy  S/P breast lumpectomy    Diet: Regular     Labs:                        9.8    10.88 )-----------( 253      ( 01 Jun 2025 07:31 )             29.6                         9.7    10.11 )-----------( 240      ( 31 May 2025 12:15 )             28.3          acetaminophen     Tablet .. 975 milliGRAM(s) Oral <User Schedule>  diphenhydrAMINE 25 milliGRAM(s) Oral every 6 hours PRN  diphtheria/tetanus/pertussis (acellular) Vaccine (Adacel) 0.5 milliLiter(s) IntraMuscular once  enoxaparin Injectable 40 milliGRAM(s) SubCutaneous every 24 hours  ibuprofen  Tablet. 600 milliGRAM(s) Oral every 6 hours  lactated ringers. 1000 milliLiter(s) IV Continuous <Continuous>  lactated ringers. 1000 milliLiter(s) IV Continuous <Continuous>  lanolin Ointment 1 Application(s) Topical every 6 hours PRN  magnesium hydroxide Suspension 30 milliLiter(s) Oral two times a day PRN  oxyCODONE    IR 5 milliGRAM(s) Oral once PRN  oxyCODONE    IR 5 milliGRAM(s) Oral every 3 hours PRN  oxytocin Infusion 42 milliUNIT(s)/Min IV Continuous <Continuous>  prenatal multivitamin 1 Tablet(s) Oral daily  simethicone 80 milliGRAM(s) Chew every 4 hours    
 SEBASTIAN SALONI  34y  Female    PGY3 Note:  Patient seen and examined bedside. Initially with poor pain control after surgery, now improved s/p dilauded dose. Denies heavy vaginal bleeding. Not yet ambulating. Tolerating diet, Bravo.    Physical Exam  Vital Signs Last 24 Hrs  T(C): 36.7 (30 May 2025 20:06), Max: 36.9 (30 May 2025 18:04)  T(F): 98.06 (30 May 2025 20:06), Max: 98.4 (30 May 2025 18:04)  HR: 70 (31 May 2025 02:01) (69 - 102)  BP: 109/64 (31 May 2025 01:57) (92/50 - 131/69)  BP(mean): --  RR: 18 (30 May 2025 18:04) (18 - 18)  SpO2: 98% (31 May 2025 02:01) (85% - 100%)    Gen: NAD, sitting comfortably  CV: RRR. No murmurs gallops or rubs.  Pulm: CTAB. No wheezes or rales.  Ext: No calf tenderness, no swelling.   Abd: mildly distended, soft, mildly tender, BS+, fundus firm, and below umbilicus.   Lochia: Minimal rubra  Wound: Pfannenstiel skin incision clean, dry intact. Dermabond. No surrounding edema or erythema.        PAST MEDICAL & SURGICAL HISTORY:  No pertinent past medical history  History of cholecystectomy  S/P breast lumpectomy      Diet: regular    Labs:                        12.5   8.48  )-----------( 310      ( 30 May 2025 16:40 )             36.7          acetaminophen     Tablet .. 975 milliGRAM(s) Oral <User Schedule>  ceFAZolin   IVPB 2000 milliGRAM(s) IV Intermittent every 8 hours  diphenhydrAMINE 25 milliGRAM(s) Oral every 6 hours PRN  diphtheria/tetanus/pertussis (acellular) Vaccine (Adacel) 0.5 milliLiter(s) IntraMuscular once  enoxaparin Injectable 40 milliGRAM(s) SubCutaneous every 24 hours  HYDROmorphone  Injectable 1 milliGRAM(s) IV Push every 15 minutes PRN  ibuprofen  Tablet. 600 milliGRAM(s) Oral every 6 hours  ketorolac   Injectable 30 milliGRAM(s) IV Push every 6 hours  lactated ringers Bolus 500 milliLiter(s) IV Bolus once  lactated ringers. 1000 milliLiter(s) IV Continuous <Continuous>  lactated ringers. 1000 milliLiter(s) IV Continuous <Continuous>  lanolin Ointment 1 Application(s) Topical every 6 hours PRN  magnesium hydroxide Suspension 30 milliLiter(s) Oral two times a day PRN  oxyCODONE    IR 5 milliGRAM(s) Oral every 3 hours PRN  oxyCODONE    IR 5 milliGRAM(s) Oral once PRN  oxytocin Infusion 42 milliUNIT(s)/Min IV Continuous <Continuous>  prenatal multivitamin 1 Tablet(s) Oral daily  simethicone 80 milliGRAM(s) Chew every 4 hours

## 2025-06-01 NOTE — DISCHARGE NOTE OB - FINANCIAL ASSISTANCE
[facility] provides services at a reduced cost to those who are determined to be eligible through [facility]’s financial assistance program. Information regarding [facility]’s financial assistance program can be found by going to [email or web address] or by calling [phone].

## 2025-06-01 NOTE — PROGRESS NOTE ADULT - ASSESSMENT
A/P: 34y yo P1 s/p STAT LTCS for fetal bradycardia, EBL 600cc, POD 1, recovering well   -ambulation encouraged  -PO hydration encouraged  -regular diet  -lovenox ordered for DVT prophylaxis  -Incentive Spirometry encouraged  -pain management per routine  -UO adequate, armenta in until afternoon  -f/u 1100 CBC + CMP  
A/P: 34y yo P1 s/p STAT LTCS for fetal bradycardia, EBL 600cc, POD 1, recovering well   -continue ancef 2g x24 hours postpartum for abx ppx  -ambulation encouraged  -PO hydration encouraged  -regular diet  -lovenox ordered for DVT prophylaxis  -Incentive Spirometry encouraged  -pain management per routine  -UO adequate, armenta in until afternoon  -f/u 1100 CBC + CMP
A/P: 34y yo P1 s/p STAT LTCS for fetal bradycardia, EBL 600cc, POD 2, recovering well   -ancef 2g x24 hours ordered to fall off   -ambulation encouraged  -PO hydration encouraged  -regular diet  -lovenox ordered for DVT prophylaxis  -Incentive Spirometry encouraged  -pain management per routine  - AM CBC wnl and stable H/H: 9.8/29.6 s/p venofer   -

## 2025-06-01 NOTE — DISCHARGE NOTE OB - CARE PROVIDER_API CALL
Joann Vega  Obstetrics and Gynecology  [facility] [facility], [address]  [place] [address]  Phone: [phone]  Fax: [phone]  Follow Up Time: 1 week

## 2025-06-01 NOTE — DISCHARGE NOTE OB - CARE PROVIDERS DIRECT ADDRESSES
[email or web address]

## 2025-06-01 NOTE — PROGRESS NOTE ADULT - ATTENDING COMMENTS
patient seen at bedside, resting comfortably, pain controlled with ERAS protocol and supplemental oxy.  She denies CP, SOB, N/V, or other complaints  She is tolerating diet, voiding without difficulty, + flatus and +BM.  She states she would like to go home today.  Hgb stable at 9 and s/p IV venofer.  PP and postop counseling done.  Pain, bleeding, infection wound care, glucose and PB monitoring discussed.  Patient understood all counseling and all questions answered satisfactorily.    Vitals WNL, hgb 9- stable    Chest: CTA, good air entry b/l,  RRR, breasts soft  Abd: sot, ND, +BS, appropriately tender, uterus firm, incision C/D/I with dermabond  Vag: light lochia  LE: 1+ b/l LE edema, NT    A: 35yo P 1001 s/p emergency C/S for fetal bradycardia, acute blood loss anemia, GDMA2 POD#2    P: continue current postop and PP care      GDM diet      OOB ambulation encouraged      pain mgmt PRN      Postop and PP counseling done      d/c home and f/u office in 2 wks or PRN
patient seen at bedside, resting comfortably, pain moderately controlled and encouraged supplemental oxy PRN.  She denies CP, SOB, N/V, or other complaints  She is tolerating diet, armenta in place but adequate output, + flatus. She has been up to chair. Labs were not ordered correctly and awaiting postop lab draw.  PP and postop counseling done.  Pain, bleeding, infection wound care, glucose and BP monitoring discussed.  Patient understood all counseling and all questions answered satisfactorily.    Vitals WNL    Chest: CTA, good air entry b/l,  RRR, breasts soft  Abd: sot, ND, +BS, appropriately tender, uterus firm, incision C/D/I with dermabond  Vag: light lochia- armenta in place  LE: 1+ b/l LE edema, NT    A: 35yo P 1001 s/p emergency C/S for fetal bradycardia, GDMA2 POD#1    P: continue current postop and PP care      GDM diet      OOB ambulation encouraged     d/c armenta - TOV      f/u postop labs      pain mgmt PRN      Postop and PP counseling done      will reassess as clinically indicated

## 2025-06-04 ENCOUNTER — APPOINTMENT (OUTPATIENT)
Dept: ANTEPARTUM | Facility: CLINIC | Age: 35
End: 2025-06-04

## 2025-06-05 DIAGNOSIS — Z3A.38 38 WEEKS GESTATION OF PREGNANCY: ICD-10-CM

## 2025-06-05 DIAGNOSIS — D62 ACUTE POSTHEMORRHAGIC ANEMIA: ICD-10-CM

## 2025-06-05 LAB — SURGICAL PATHOLOGY STUDY: SIGNIFICANT CHANGE UP

## 2025-06-13 ENCOUNTER — APPOINTMENT (OUTPATIENT)
Dept: OBGYN | Facility: CLINIC | Age: 35
End: 2025-06-13

## 2025-06-13 ENCOUNTER — NON-APPOINTMENT (OUTPATIENT)
Age: 35
End: 2025-06-13

## 2025-06-13 VITALS
DIASTOLIC BLOOD PRESSURE: 83 MMHG | WEIGHT: 188 LBS | BODY MASS INDEX: 33.31 KG/M2 | TEMPERATURE: 98.6 F | HEIGHT: 63 IN | HEART RATE: 74 BPM | SYSTOLIC BLOOD PRESSURE: 119 MMHG

## 2025-06-13 PROCEDURE — XXXXX: CPT | Mod: 1L

## 2025-06-13 RX ORDER — CEFIXIME 400 MG/1
400 CAPSULE ORAL DAILY
Qty: 10 | Refills: 0 | Status: ACTIVE | COMMUNITY
Start: 2025-06-13 | End: 1900-01-01

## 2025-07-08 ENCOUNTER — APPOINTMENT (OUTPATIENT)
Dept: OBGYN | Facility: CLINIC | Age: 35
End: 2025-07-08

## 2025-08-08 ENCOUNTER — APPOINTMENT (OUTPATIENT)
Dept: OBGYN | Facility: CLINIC | Age: 35
End: 2025-08-08

## 2025-08-08 ENCOUNTER — NON-APPOINTMENT (OUTPATIENT)
Age: 35
End: 2025-08-08

## 2025-08-08 VITALS
WEIGHT: 182 LBS | SYSTOLIC BLOOD PRESSURE: 103 MMHG | TEMPERATURE: 98.6 F | DIASTOLIC BLOOD PRESSURE: 76 MMHG | HEIGHT: 63 IN | HEART RATE: 80 BPM | BODY MASS INDEX: 32.25 KG/M2

## 2025-08-08 LAB — GLUCOSE BS SERPL-MCNC: 91 MG/DL

## 2025-08-10 LAB
BASOPHILS # BLD AUTO: 0.04 K/UL
BASOPHILS NFR BLD AUTO: 0.7 %
EOSINOPHIL # BLD AUTO: 0.12 K/UL
EOSINOPHIL NFR BLD AUTO: 2.1 %
HCT VFR BLD CALC: 39.5 %
HGB BLD-MCNC: 12.6 G/DL
IMM GRANULOCYTES NFR BLD AUTO: 0.2 %
LYMPHOCYTES # BLD AUTO: 2.18 K/UL
LYMPHOCYTES NFR BLD AUTO: 38 %
MAN DIFF?: NORMAL
MCHC RBC-ENTMCNC: 29 PG
MCHC RBC-ENTMCNC: 31.9 G/DL
MCV RBC AUTO: 90.8 FL
MONOCYTES # BLD AUTO: 0.41 K/UL
MONOCYTES NFR BLD AUTO: 7.2 %
NEUTROPHILS # BLD AUTO: 2.97 K/UL
NEUTROPHILS NFR BLD AUTO: 51.8 %
PLATELET # BLD AUTO: 339 K/UL
RBC # BLD: 4.35 M/UL
RBC # FLD: 12.7 %
WBC # FLD AUTO: 5.73 K/UL